# Patient Record
Sex: FEMALE | Race: WHITE | NOT HISPANIC OR LATINO | Employment: OTHER | URBAN - METROPOLITAN AREA
[De-identification: names, ages, dates, MRNs, and addresses within clinical notes are randomized per-mention and may not be internally consistent; named-entity substitution may affect disease eponyms.]

---

## 2017-02-03 ENCOUNTER — OFFICE VISIT (OUTPATIENT)
Dept: NEUROLOGY | Facility: HOSPITAL | Age: 62
End: 2017-02-03
Attending: INTERNAL MEDICINE
Payer: COMMERCIAL

## 2017-02-03 VITALS
DIASTOLIC BLOOD PRESSURE: 86 MMHG | BODY MASS INDEX: 42.27 KG/M2 | WEIGHT: 263 LBS | HEIGHT: 66 IN | SYSTOLIC BLOOD PRESSURE: 155 MMHG | TEMPERATURE: 98 F | HEART RATE: 96 BPM

## 2017-02-03 DIAGNOSIS — D3A.090 BRONCHIAL CARCINOID TUMORS: Primary | ICD-10-CM

## 2017-02-03 PROCEDURE — 3079F DIAST BP 80-89 MM HG: CPT | Mod: ,,, | Performed by: INTERNAL MEDICINE

## 2017-02-03 PROCEDURE — 99215 OFFICE O/P EST HI 40 MIN: CPT | Performed by: INTERNAL MEDICINE

## 2017-02-03 PROCEDURE — 99214 OFFICE O/P EST MOD 30 MIN: CPT | Mod: ,,, | Performed by: INTERNAL MEDICINE

## 2017-02-03 PROCEDURE — 3077F SYST BP >= 140 MM HG: CPT | Mod: ,,, | Performed by: INTERNAL MEDICINE

## 2017-02-03 RX ORDER — PHENAZOPYRIDINE HYDROCHLORIDE 100 MG/1
TABLET, FILM COATED ORAL
Refills: 0 | COMMUNITY
Start: 2017-01-19 | End: 2017-02-03

## 2017-02-03 RX ORDER — BACLOFEN 10 MG/1
10 TABLET ORAL 2 TIMES DAILY
Refills: 2 | COMMUNITY
Start: 2017-01-04

## 2017-02-03 RX ORDER — HYDROCHLOROTHIAZIDE 25 MG/1
TABLET ORAL
Refills: 1 | COMMUNITY
Start: 2016-11-14

## 2017-02-03 RX ORDER — LANREOTIDE ACETATE 120 MG/.5ML
120 INJECTION SUBCUTANEOUS
Refills: 5 | COMMUNITY
Start: 2017-01-11 | End: 2017-08-25 | Stop reason: SDUPTHER

## 2017-02-03 RX ORDER — VALSARTAN AND HYDROCHLOROTHIAZIDE 160; 12.5 MG/1; MG/1
1 TABLET, FILM COATED ORAL DAILY
Refills: 1 | COMMUNITY
Start: 2016-12-05 | End: 2019-01-22 | Stop reason: CLARIF

## 2017-02-03 NOTE — PROGRESS NOTES
NOLANETS:  [facility]  A collaboration between [facility] and [facility]    PATIENT: Yvonne Holliday  MRN: [id number]  DATE: 2/3/2017      Diagnosis:   1. Bronchial carcinoid tumors        Chief Complaint: Follow-up (lung carcinoid/ 6 month follow up)      Oncologic History:      Oncologic History Bronchial carcinoid  diagnosed 10/2005     Oncologic Treatment Sandostatin  Lanreotide 7/2016     Pathology Well differentiated neuroendocrine tumor           Subjective:    Interval History: Ms. Holliday is a 61 y.o. female who returns for follow up for a bronchial carcinoid tumor.  Since I had seen her last she was started on Lanreotide.  She is tolerating this well.  She still has some ongoing cough and fatigue.  She states the symptoms have somewhat improved since being on Lanreotide.  She has no other new complaints.     She was previously seen in this clinic by Salomón Jimenez and Atul but has not been seen since 4/2012.  Her history dates to 2005 when she was diagnosed with a bronchial carcinoid after having a chronic cough for a number of years.  She had had an abnormal x-ray which was monitored and had a biopsy in 9/2005 and was diagnosed with a well-differentiated neuroendocrine carcinoma.  Ki-67 demonstrated that the tumor had a low proliferative activity and was described as a rare tumor cell positive.  She had followed up at [facility] with Dr. Renan Vick who initially started her on short acting octreotide.  She was seen by Dr. Jimenez in July 2009 and converted to octreotide LAR.  She had a mild improvement in her cough but it also noted an improvement in symptoms of flushing.  She is followed in [facility] by Dr. Garibay.  In 7/2016 she was changed to Lanreotide.      Past Medical History:   Past Medical History   Diagnosis Date    Abnormal CT of liver 7/15/2016    Adnexal mass 7/15/2016    Bronchial  carcinoid tumors 7/15/2016    Carcinoid syndrome 7/15/2016    Carcinoid tumor of lung 2005    Coughing     Drug therapy      sandostatin    HTN (hypertension)     Spastic paraplegia, hereditary      walks with cane    Wheezing        Past Surgical HIstory:   Past Surgical History   Procedure Laterality Date     section      Breast surgery       breast reduction       Family History:   Family History   Problem Relation Age of Onset    Cancer Mother      unknown primary    Cancer Maternal Aunt     Cancer Paternal Grandfather      prostate    Cancer Maternal Aunt      gyn       Social History:  reports that she has never smoked. She does not have any smokeless tobacco history on file. She reports that she does not drink alcohol or use illicit drugs.    Allergies:  Review of patient's allergies indicates:   Allergen Reactions    Epinephrine      Neuroendocrine Tumor patient      Pcn [penicillins]        Medications:  Current Outpatient Prescriptions   Medication Sig Dispense Refill    albuterol (PROAIR HFA) 90 mcg/actuation inhaler Inhale 2 puffs into the lungs every 6 (six) hours as needed for Wheezing.      allopurinol (ZYLOPRIM) 100 MG tablet Take 100 mg by mouth 3 (three) times daily.      AMINO ACIDS/MV,FE,MIN (OCUVITE EXTRA ORAL) Take by mouth.      baclofen (LIORESAL) 10 MG tablet 10 mg every evening.   2    budesonide-formoterol 80-4.5 mcg (SYMBICORT) 80-4.5 mcg/actuation HFAA Inhale 2 puffs into the lungs.      buPROPion (WELLBUTRIN XL) 150 MG TB24 tablet Take 150 mg by mouth once daily.      cetirizine (ZYRTEC) 10 MG tablet Take 10 mg by mouth once daily.      clonazePAM (KLONOPIN) 0.5 MG tablet Take 0.5 mg by mouth 2 (two) times daily as needed for Anxiety.      cyanocobalamin (VITAMIN B-12) 1000 MCG tablet Take 100 mcg by mouth once daily.      dextromethorphan (DELSYM) 30 mg/5 mL liquid Take 60 mg by mouth nightly as needed for Cough.      diclofenac sodium  (VOLTAREN) 1 % Gel Apply 2 g topically 4 (four) times daily.      docusate sodium (COLACE) 100 MG capsule Take 100 mg by mouth every evening.       gabapentin (NEURONTIN) 100 MG capsule Take 100 mg by mouth 2 (two) times daily.      hydrochlorothiazide (HYDRODIURIL) 25 MG tablet as needed.   1    hydrocodone-homatropine 5-1.5 mg/5 ml (HYCODAN) 5-1.5 mg/5 mL Syrp Take 5 mLs by mouth every 4 (four) hours as needed.      latanoprost 0.005 % ophthalmic solution Place 1 drop into both eyes every evening.      methocarbamol (ROBAXIN) 500 MG Tab Take 500 mg by mouth 2 (two) times daily as needed.      multivitamin with minerals tablet Take 1 tablet by mouth once daily.      POTASSIUM CITRATE ORAL Take 99 mg by mouth every morning.      ranitidine (ZANTAC) 75 MG tablet Take 150 mg by mouth nightly.       SOMATULINE DEPOT 120 mg/0.5 mL Syrg 120 mg every 28 days.   5    valsartan-hydrochlorothiazide (DIOVAN-HCT) 160-12.5 mg per tablet 1 tablet once daily.   1     No current facility-administered medications for this visit.        Review of Systems   Constitutional: Positive for fatigue. Negative for chills, fever and unexpected weight change.   HENT: Negative for congestion, hearing loss and nosebleeds.    Eyes: Negative for visual disturbance.   Respiratory: Positive for cough and shortness of breath.    Cardiovascular: Positive for leg swelling. Negative for chest pain and palpitations.   Gastrointestinal: Negative for abdominal pain, blood in stool, constipation, diarrhea, nausea and vomiting.   Genitourinary: Negative for dysuria.   Musculoskeletal: Positive for arthralgias and myalgias. Negative for back pain and gait problem.   Skin: Negative for color change and rash.        Flushing   Neurological: Negative for dizziness, weakness and headaches.   Hematological: Negative for adenopathy. Does not bruise/bleed easily.   Psychiatric/Behavioral: Negative for confusion.       ECOG Performance Status: 1  "  Objective:      Vitals:   Vitals:    02/03/17 0928   BP: (!) 155/86   Pulse: 96   Temp: 98 °F (36.7 °C)   TempSrc: Oral   Weight: 119.3 kg (263 lb)   Height: 5' 6" (1.676 m)     BMI: Body mass index is 42.45 kg/(m^2).    Physical Exam   Constitutional: She is oriented to person, place, and time. She appears well-developed and well-nourished. No distress.   HENT:   Head: Normocephalic.   Mouth/Throat: No oropharyngeal exudate.   Eyes: EOM are normal. No scleral icterus.   Neck: Neck supple. No tracheal deviation present. No thyromegaly present.   Cardiovascular: Normal rate and regular rhythm.    Pulmonary/Chest: Effort normal and breath sounds normal. No respiratory distress. She has no wheezes. She has no rales.   Abdominal: Soft. She exhibits no distension and no mass. There is no tenderness. There is no rebound and no guarding.   Musculoskeletal: Normal range of motion. She exhibits edema.   Lymphadenopathy:     She has no cervical adenopathy.   Neurological: She is alert and oriented to person, place, and time. No cranial nerve deficit.   Skin: Skin is warm and dry.   Psychiatric: She has a normal mood and affect.       Laboratory Data:  No visits with results within 1 Month(s) from this visit.  Latest known visit with results is:    Lab Visit on 07/13/2016   Component Date Value Ref Range Status    Chromogranin A 07/13/2016 <5  < OR = 15 ng/mL Final    Comment: This test was performed using a laboratory developed  electrochemiluminescent method. Values obtained with different assay  methods cannot be used interchangeably. Chromogranin A levels,  regardless of value, should not be interpreted as absolute evidence of  the presence or absence of disease.  This test was developed and its analytical performance characteristics  have been determined by [employer] [facility]  [facility]. It has not been cleared or approved by FDA. This assay has  been validated pursuant to the CLIA " regulations and is used for  clinical purposes.  Test Performed at:  [facility]  [address] [place]  [address]     JULIAN Thompson MD, PhD      Neuron Specific Enolase 07/13/2016 9.5  <=15 ng/mL Final    Comment: -------------------ADDITIONAL INFORMATION-------------------  The testing method is a homogeneous time-resolved   immunofluorescent assay manufactured by [employer] and  performed on the [employer] Krypt[employer]act.   Values obtained with different assay methods or kits  may be different and cannot be used interchangeably.  If ordered as a tumor marker, this test result cannot  be interpreted as absolute evidence for the presence  or absence of malignant disease.  Test Performed by:  [facility] [place] [address]  : Ned Gregory II, M.D., Ph.D.      Substance P, P/S 07/13/2016 593  <1700 pg/mL Final    Histamine 07/13/2016 1161  180 - 1800 nmol/L Final    Comment: INTERPRETIVE INFORMATION:  Histamine, Whole Blood  Test developed and characteristics determined by [employer]. See Compliance Statement D: [employer]  Performed by [facility],                              [address] [place] [id number] [phone]                    [email or web address], Al Contreras MD - Lab. Director      Serotonin 07/13/2016 138  <=230 ng/mL Final    Comment: Test Performed by:  [place] [id number]  : Ned Gregory II, M.D., Ph.D.      Sodium 07/13/2016 141  136 - 145 mmol/L Final    Potassium 07/13/2016 3.7  3.5 - 5.1 mmol/L Final    Chloride 07/13/2016 103  95 - 110 mmol/L Final    CO2 07/13/2016 29  23 - 29 mmol/L Final    Glucose 07/13/2016 101  70 - 110 mg/dL Final    BUN, Bld 07/13/2016 13  8 - 23 mg/dL Final    Creatinine 07/13/2016 0.8  0.5 - 1.4 mg/dL Final     Calcium 07/13/2016 9.2  8.7 - 10.5 mg/dL Final    Total Protein 07/13/2016 7.0  6.0 - 8.4 g/dL Final    Albumin 07/13/2016 3.3* 3.5 - 5.2 g/dL Final    Total Bilirubin 07/13/2016 0.6  0.1 - 1.0 mg/dL Final    Comment: For infants and newborns, interpretation of results should be based  on gestational age, weight and in agreement with clinical  observations.  Premature Infant recommended reference ranges:  Up to 24 hours.............<8.0 mg/dL  Up to 48 hours............<12.0 mg/dL  3-5 days..................<15.0 mg/dL  6-29 days.................<15.0 mg/dL      Alkaline Phosphatase 07/13/2016 112  55 - 135 U/L Final    AST 07/13/2016 19  10 - 40 U/L Final    ALT 07/13/2016 19  10 - 44 U/L Final    Anion Gap 07/13/2016 9  8 - 16 mmol/L Final    eGFR if African American 07/13/2016 >60  >60 mL/min/1.73 m^2 Final    eGFR if non African American 07/13/2016 >60  >60 mL/min/1.73 m^2 Final    Comment: Calculation used to obtain the estimated glomerular filtration  rate (eGFR) is the CKD-EPI equation. Since race is unknown   in our information system, the eGFR values for   -American and Non--American patients are given   for each creatinine result.      WBC 07/13/2016 8.07  3.90 - 12.70 K/uL Final    RBC 07/13/2016 4.03  4.00 - 5.40 M/uL Final    Hemoglobin 07/13/2016 12.0  12.0 - 16.0 g/dL Final    Hematocrit 07/13/2016 36.8* 37.0 - 48.5 % Final    MCV 07/13/2016 91  82 - 98 fL Final    MCH 07/13/2016 29.8  27.0 - 31.0 pg Final    MCHC 07/13/2016 32.6  32.0 - 36.0 % Final    RDW 07/13/2016 14.1  11.5 - 14.5 % Final    Platelets 07/13/2016 327  150 - 350 K/uL Final    MPV 07/13/2016 9.8  9.2 - 12.9 fL Final    Gran # 07/13/2016 5.1  1.8 - 7.7 K/uL Final    Lymph # 07/13/2016 2.2  1.0 - 4.8 K/uL Final    Mono # 07/13/2016 0.6  0.3 - 1.0 K/uL Final    Eos # 07/13/2016 0.2  0.0 - 0.5 K/uL Final    Baso # 07/13/2016 0.02  0.00 - 0.20 K/uL Final    Gran% 07/13/2016 62.7  38.0 - 73.0 % Final     Lymph% 07/13/2016 27.0  18.0 - 48.0 % Final    Mono% 07/13/2016 7.4  4.0 - 15.0 % Final    Eosinophil% 07/13/2016 2.5  0.0 - 8.0 % Final    Basophil% 07/13/2016 0.2  0.0 - 1.9 % Final    Differential Method 07/13/2016 Automated   Final    Craig Miscellaneous Result 07/13/2016 See Comment   Final    Comment: Test                       Result         Flag  Unit  RefValue  --------------------------------------------------------------  Substance P                                                     Test         Result   Units    Reference Range  Substance P: 593       pg/mL     <1700 pg/mL  The performance characteristics of this assay was   validated by [employer].  The [place] FDA  has not approved or cleared this test.  [employer]. is a CLIA certified, CAP accredited  laboratory for performing high complexity assays such  as this one.  Test Performed by:  [employer] [facility].  [address] [place] [address]         Imaging:   CT from 11/2016 reviewed with patient             Assessment:       1. Bronchial carcinoid tumors           Plan:     Mrs. Holliday is tolerating treatment with Lanreotide and seems to have an improvement in some of her symptoms.  Review of her CT from 11/2016 shows no detrimental changes.  I will discuss her case and her multidisciplinary neuroendocrine tumor Board and have our radiologist review these images as well.  She should remain on Lanreotide.  Follow back up with me with another CT of the chest in 6 months.      Lobo Smith DO, FACP  Hematology & Oncology, [facility]  [address], [address]  [place]  [address]  ph. [phone]; [phone]  fax. [phone]    25 minutes were spent in coordination of patient's care, record review and counseling.  More than 50% of the time was face-to-face.

## 2017-02-03 NOTE — MR AVS SNAPSHOT
[facility]  [address] [place] [address]  Phone: [phone]  Fax: [phone]                  Yvonne Holliday   2/3/2017 9:30 AM   Office Visit    Description:  Female : 1955   Provider:  Lobo Smith DO, COLT   Department:  [facility]           Reason for Visit     Follow-up           Diagnoses this Visit        Comments    Bronchial carcinoid tumors    -  Primary            To Do List           Future Appointments        Provider Department Dept Phone    2017 11:00 AM Lobo Smith DO, FACRICHELLE [facility] [phone]      Goals (5 Years of Data)     None      [facility]     [facility] Nurse Care Line -  Assistance  Registered nurses in the [facility] provide clinical advisement, health education, appointment booking, and other advisory services.  Call for this free service at [phone].             Medications           Message regarding Medications     Verify the changes and/or additions to your medication regime listed below are the same as discussed with your clinician today.  If any of these changes or additions are incorrect, please notify your healthcare provider.        STOP taking these medications     magnesium 250 mg Tab Take 2 tablets by mouth once daily.    octreotide lar (SANDOSTATIN LAR) 30 mg injection Inject 30 mg into the muscle every 28 days.    phenazopyridine (PYRIDIUM) 100 MG tablet            Verify that the below list of medications is an accurate representation of the medications you are currently taking.  If none reported, the list may be blank. If incorrect, please contact your healthcare provider. Carry this list with you in case of emergency.           Current Medications     albuterol (PROAIR HFA) 90 mcg/actuation inhaler Inhale 2 puffs into the lungs every 6 (six) hours as needed for Wheezing.    allopurinol (ZYLOPRIM) 100 MG tablet Take 100 mg by mouth 3  "(three) times daily.    AMINO ACIDS/MV,FE,MIN (OCUVITE EXTRA ORAL) Take by mouth.    baclofen (LIORESAL) 10 MG tablet 10 mg every evening.     budesonide-formoterol 80-4.5 mcg (SYMBICORT) 80-4.5 mcg/actuation HFAA Inhale 2 puffs into the lungs.    buPROPion (WELLBUTRIN XL) 150 MG TB24 tablet Take 150 mg by mouth once daily.    cetirizine (ZYRTEC) 10 MG tablet Take 10 mg by mouth once daily.    clonazePAM (KLONOPIN) 0.5 MG tablet Take 0.5 mg by mouth 2 (two) times daily as needed for Anxiety.    cyanocobalamin (VITAMIN B-12) 1000 MCG tablet Take 100 mcg by mouth once daily.    dextromethorphan (DELSYM) 30 mg/5 mL liquid Take 60 mg by mouth nightly as needed for Cough.    diclofenac sodium (VOLTAREN) 1 % Gel Apply 2 g topically 4 (four) times daily.    docusate sodium (COLACE) 100 MG capsule Take 100 mg by mouth every evening.     gabapentin (NEURONTIN) 100 MG capsule Take 100 mg by mouth 2 (two) times daily.    hydrochlorothiazide (HYDRODIURIL) 25 MG tablet as needed.     hydrocodone-homatropine 5-1.5 mg/5 ml (HYCODAN) 5-1.5 mg/5 mL Syrp Take 5 mLs by mouth every 4 (four) hours as needed.    latanoprost 0.005 % ophthalmic solution Place 1 drop into both eyes every evening.    methocarbamol (ROBAXIN) 500 MG Tab Take 500 mg by mouth 2 (two) times daily as needed.    multivitamin with minerals tablet Take 1 tablet by mouth once daily.    POTASSIUM CITRATE ORAL Take 99 mg by mouth every morning.    ranitidine (ZANTAC) 75 MG tablet Take 150 mg by mouth nightly.     SOMATULINE DEPOT 120 mg/0.5 mL Syrg 120 mg every 28 days.     valsartan-hydrochlorothiazide (DIOVAN-HCT) 160-12.5 mg per tablet 1 tablet once daily.            Clinical Reference Information           Your Vitals Were     BP Pulse Temp Height Weight BMI    155/86 96 98 °F (36.7 °C) (Oral) 5' 6" (1.676 m) 119.3 kg (263 lb) 42.45 kg/m2      Blood Pressure          Most Recent Value    BP  (!)  155/86      Allergies as of 2/3/2017     Epinephrine    Pcn " [Penicillins]      Immunizations Administered on Date of Encounter - 2/3/2017     None      Instructions    We will talk about you in our Tumor board in the next couple of weeks.      F/u in 6 months with a CT    I will request reports from John for your CT from November 2016         Language Assistance Services     ATTENTION: Language assistance services are available, free of charge. Please call [phone].      ATENCIÓN: Si habla español, tiene a cochran disposición servicios gratuitos de asistencia lingüística. Llame al [phone].     [facility] Ý: N?u b?n nói Ti?ng Vi?t, có các d?ch v? h? tr? ngôn ng? mi?n phí dành cho b?n. G?i s? [phone].         [facility] complies with applicable Federal civil rights laws and does not discriminate on the basis of race, color, national origin, age, disability, or sex.

## 2017-02-03 NOTE — PATIENT INSTRUCTIONS
We will talk about you in our Tumor board in the next couple of weeks.      F/u in 6 months with a CT    I will request reports from John for your CT from November 2016

## 2017-06-01 ENCOUNTER — TELEPHONE (OUTPATIENT)
Dept: NEUROLOGY | Facility: HOSPITAL | Age: 62
End: 2017-06-01

## 2017-06-01 NOTE — TELEPHONE ENCOUNTER
----- Message from Katharina Hernández sent at 6/1/2017  3:36 PM CDT -----  Contact: Christin with [facility]  RR- Christin with [facility] called to states they don't have a form from the doctor on file stating it is all right to deliver the Somatuline  to the patients home. Christin can be reached at [phone] with a verbal or fax a letter to [phone].

## 2017-06-01 NOTE — TELEPHONE ENCOUNTER
Returned call to inform that it is OK to deliver medication to pts home. No answer. LVM for Christin with this information and to return call with any other questions.

## 2017-08-11 ENCOUNTER — OFFICE VISIT (OUTPATIENT)
Dept: NEUROLOGY | Facility: HOSPITAL | Age: 62
End: 2017-08-11
Attending: INTERNAL MEDICINE
Payer: COMMERCIAL

## 2017-08-11 VITALS
HEART RATE: 93 BPM | SYSTOLIC BLOOD PRESSURE: 156 MMHG | HEIGHT: 63 IN | DIASTOLIC BLOOD PRESSURE: 72 MMHG | WEIGHT: 266.81 LBS | TEMPERATURE: 98 F | BODY MASS INDEX: 47.27 KG/M2

## 2017-08-11 DIAGNOSIS — D3A.090 BRONCHIAL CARCINOID TUMORS: Primary | ICD-10-CM

## 2017-08-11 PROCEDURE — 3078F DIAST BP <80 MM HG: CPT | Mod: ,,, | Performed by: INTERNAL MEDICINE

## 2017-08-11 PROCEDURE — 3077F SYST BP >= 140 MM HG: CPT | Mod: ,,, | Performed by: INTERNAL MEDICINE

## 2017-08-11 PROCEDURE — 99215 OFFICE O/P EST HI 40 MIN: CPT | Performed by: INTERNAL MEDICINE

## 2017-08-11 PROCEDURE — 99214 OFFICE O/P EST MOD 30 MIN: CPT | Mod: ,,, | Performed by: INTERNAL MEDICINE

## 2017-08-11 PROCEDURE — 3008F BODY MASS INDEX DOCD: CPT | Mod: ,,, | Performed by: INTERNAL MEDICINE

## 2017-08-11 NOTE — PROGRESS NOTES
NOLANETS:  [facility]  A collaboration between [facility] and [facility]    PATIENT: Yvonne Holliday  MRN: [id number]  DATE: 8/11/2017      Diagnosis:   1. Bronchial carcinoid tumors        Chief Complaint: Follow-up (f/u 6 months with scans)      Oncologic History:      Oncologic History Bronchial carcinoid  diagnosed 10/2005     Oncologic Treatment Sandostatin  Lanreotide 7/2016     Pathology Well differentiated neuroendocrine tumor           Subjective:    Interval History: Ms. Holliday is a 62 y.o. female who returns for follow up for a bronchial carcinoid tumor.  She remains on lanreotide. Continues to complain of fatigue, cough and dyspnea.  No other new complaints.    She was previously seen in this clinic by Salomón Jimenez and Atul but has not been seen since 4/2012.  Her history dates to 2005 when she was diagnosed with a bronchial carcinoid after having a chronic cough for a number of years.  She had had an abnormal x-ray which was monitored and had a biopsy in 9/2005 and was diagnosed with a well-differentiated neuroendocrine carcinoma.  Ki-67 demonstrated that the tumor had a low proliferative activity and was described as a rare tumor cell positive.  [facility] Renan Vick who initially started her on short acting octreotide.  She was seen by Dr. Jimenez in July 2009 and converted to octreotide LAR.  She had a mild improvement in her cough but it also noted an improvement in symptoms of flushing.  She is followed in [facility] by Dr. Garibay.  In 7/2016 she was changed to Lanreotide.      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 She does not have any smokeless tobacco history on file. She reports that she does not drink alcohol or use drugs.    Allergies:  Review of patient's allergies indicates:   Allergen Reactions    Epinephrine      Neuroendocrine Tumor patient      Pcn [penicillins]        Medications:  Current Outpatient Prescriptions   Medication Sig Dispense Refill    albuterol (PROAIR HFA) 90 mcg/actuation inhaler Inhale 2 puffs into the lungs every 6 (six) hours as needed for Wheezing.      AMINO ACIDS/MV,FE,MIN (OCUVITE EXTRA ORAL) Take by mouth.      baclofen (LIORESAL) 10 MG tablet 10 mg every evening.   2    budesonide-formoterol 80-4.5 mcg (SYMBICORT) 80-4.5 mcg/actuation HFAA Inhale 2 puffs into the lungs.      cetirizine (ZYRTEC) 10 MG tablet Take 10 mg by mouth once daily.      clonazePAM (KLONOPIN) 0.5 MG tablet Take 0.5 mg by mouth 2 (two) times daily as needed for Anxiety.      cyanocobalamin (VITAMIN B-12) 1000 MCG tablet Take 100 mcg by mouth once daily.      dextromethorphan (DELSYM) 30 mg/5 mL liquid Take 60 mg by mouth nightly as needed for Cough.      diclofenac sodium (VOLTAREN) 1 % Gel Apply 2 g topically 4 (four) times daily.      docusate sodium (COLACE) 100 MG capsule Take 100 mg by mouth every evening.       gabapentin (NEURONTIN) 100 MG capsule Take 100 mg by mouth 2 (two) times daily.      hydrochlorothiazide (HYDRODIURIL) 25 MG tablet as needed.   1    hydrocodone-homatropine 5-1.5 mg/5  "ml (HYCODAN) 5-1.5 mg/5 mL Syrp Take 5 mLs by mouth every 4 (four) hours as needed.      latanoprost 0.005 % ophthalmic solution Place 1 drop into both eyes every evening.      methocarbamol (ROBAXIN) 500 MG Tab Take 500 mg by mouth 2 (two) times daily as needed.      multivitamin with minerals tablet Take 1 tablet by mouth once daily.      POTASSIUM CITRATE ORAL Take 99 mg by mouth every morning.      ranitidine (ZANTAC) 75 MG tablet Take 150 mg by mouth nightly.       SOMATULINE DEPOT 120 mg/0.5 mL Syrg 120 mg every 28 days.   5    valsartan-hydrochlorothiazide (DIOVAN-HCT) 160-12.5 mg per tablet 1 tablet once daily.   1     No current facility-administered medications for this visit.        Review of Systems   Constitutional: Positive for fatigue. Negative for chills, fever and unexpected weight change.   HENT: Negative for congestion, hearing loss and nosebleeds.    Eyes: Negative for visual disturbance.   Respiratory: Positive for cough and shortness of breath.    Cardiovascular: Positive for leg swelling. Negative for chest pain and palpitations.   Gastrointestinal: Negative for abdominal pain, blood in stool, constipation, diarrhea, nausea and vomiting.   Genitourinary: Negative for dysuria.   Musculoskeletal: Positive for arthralgias and myalgias. Negative for back pain and gait problem.   Skin: Negative for color change and rash.        Flushing   Neurological: Negative for dizziness, weakness and headaches.   Hematological: Negative for adenopathy. Does not bruise/bleed easily.   Psychiatric/Behavioral: Negative for confusion.       ECOG Performance Status: 1   Objective:      Vitals:   Vitals:    08/11/17 1103   BP: (!) 156/72   Pulse: 93   Temp: 98.2 °F (36.8 °C)   TempSrc: Oral   Weight: 121 kg (266 lb 12.8 oz)   Height: 5' 3" (1.6 m)     BMI: Body mass index is 47.26 kg/m².    Physical Exam   Constitutional: She is oriented to person, place, and time. She appears well-developed and " well-nourished. No distress.   HENT:   Head: Normocephalic.   Mouth/Throat: No oropharyngeal exudate.   Eyes: EOM are normal. No scleral icterus.   Neck: Neck supple. No tracheal deviation present. No thyromegaly present.   Cardiovascular: Normal rate and regular rhythm.    Pulmonary/Chest: Effort normal and breath sounds normal. No respiratory distress. She has no wheezes. She has no rales.   Abdominal: Soft. She exhibits no distension and no mass. There is no tenderness. There is no rebound and no guarding.   Musculoskeletal: Normal range of motion. She exhibits edema.   Lymphadenopathy:     She has no cervical adenopathy.   Neurological: She is alert and oriented to person, place, and time. No cranial nerve deficit.   Skin: Skin is warm and dry.   Psychiatric: She has a normal mood and affect.       Laboratory Data:  No visits with results within 1 Month(s) from this visit.   Latest known visit with results is:   Lab Visit on 07/13/2016   Component Date Value Ref Range Status    Chromogranin A 07/20/2016 <5  < OR = 15 ng/mL Final    Comment: This test was performed using a laboratory developed  electrochemiluminescent method. Values obtained with different assay  methods cannot be used interchangeably. Chromogranin A levels,  regardless of value, should not be interpreted as absolute evidence of  the presence or absence of disease.  This test was developed and its analytical performance characteristics  have been determined by [facility]. It has not been cleared or approved by FDA. This assay has  been validated pursuant to the CLIA regulations and is used for  clinical purposes.  Test Performed at:  [facility] [address]  [address]     JULIAN Thompson MD, PhD      Neuron Specific Enolase 07/14/2016 9.5  <=15 ng/mL Final    Comment: -------------------ADDITIONAL INFORMATION-------------------  The testing  method is a homogeneous time-resolved   immunofluorescent assay manufactured by [employer] and  performed on the [employer].   Values obtained with different assay methods or kits  may be different and cannot be used interchangeably.  If ordered as a tumor marker, this test result cannot  be interpreted as absolute evidence for the presence  or absence of malignant disease.  Test Performed by:  [place] [address]  : Ned Gregory II, M.D., Ph.D.      Substance P, P/S 09/08/2016 593  <1700 pg/mL Final    Histamine 07/16/2016 1161  180 - 1800 nmol/L Final    Comment: INTERPRETIVE INFORMATION:  Histamine, Whole Blood  Test developed and characteristics determined by [employer]. See Compliance Statement D: [employer]  Performed by [facility],                              [address] [place] [id number] [phone]                    [email or web address], Al Contreras MD - Lab. Director      Serotonin 07/18/2016 138  <=230 ng/mL Final    Comment: Test Performed by:  [facility] [place] [address]  : Ned Gregory II, M.D., Ph.D.      Sodium 07/13/2016 141  136 - 145 mmol/L Final    Potassium 07/13/2016 3.7  3.5 - 5.1 mmol/L Final    Chloride 07/13/2016 103  95 - 110 mmol/L Final    CO2 07/13/2016 29  23 - 29 mmol/L Final    Glucose 07/13/2016 101  70 - 110 mg/dL Final    BUN, Bld 07/13/2016 13  8 - 23 mg/dL Final    Creatinine 07/13/2016 0.8  0.5 - 1.4 mg/dL Final    Calcium 07/13/2016 9.2  8.7 - 10.5 mg/dL Final    Total Protein 07/13/2016 7.0  6.0 - 8.4 g/dL Final    Albumin 07/13/2016 3.3* 3.5 - 5.2 g/dL Final    Total Bilirubin 07/13/2016 0.6  0.1 - 1.0 mg/dL Final    Comment: For infants and newborns, interpretation of results should be based  on gestational age, weight and in agreement with  clinical  observations.  Premature Infant recommended reference ranges:  Up to 24 hours.............<8.0 mg/dL  Up to 48 hours............<12.0 mg/dL  3-5 days..................<15.0 mg/dL  6-29 days.................<15.0 mg/dL      Alkaline Phosphatase 07/13/2016 112  55 - 135 U/L Final    AST 07/13/2016 19  10 - 40 U/L Final    ALT 07/13/2016 19  10 - 44 U/L Final    Anion Gap 07/13/2016 9  8 - 16 mmol/L Final    eGFR if African American 07/13/2016 >60  >60 mL/min/1.73 m^2 Final    eGFR if non African American 07/13/2016 >60  >60 mL/min/1.73 m^2 Final    Comment: Calculation used to obtain the estimated glomerular filtration  rate (eGFR) is the CKD-EPI equation. Since race is unknown   in our information system, the eGFR values for   -American and Non--American patients are given   for each creatinine result.      WBC 07/13/2016 8.07  3.90 - 12.70 K/uL Final    RBC 07/13/2016 4.03  4.00 - 5.40 M/uL Final    Hemoglobin 07/13/2016 12.0  12.0 - 16.0 g/dL Final    Hematocrit 07/13/2016 36.8* 37.0 - 48.5 % Final    MCV 07/13/2016 91  82 - 98 fL Final    MCH 07/13/2016 29.8  27.0 - 31.0 pg Final    MCHC 07/13/2016 32.6  32.0 - 36.0 % Final    RDW 07/13/2016 14.1  11.5 - 14.5 % Final    Platelets 07/13/2016 327  150 - 350 K/uL Final    MPV 07/13/2016 9.8  9.2 - 12.9 fL Final    Gran # 07/13/2016 5.1  1.8 - 7.7 K/uL Final    Lymph # 07/13/2016 2.2  1.0 - 4.8 K/uL Final    Mono # 07/13/2016 0.6  0.3 - 1.0 K/uL Final    Eos # 07/13/2016 0.2  0.0 - 0.5 K/uL Final    Baso # 07/13/2016 0.02  0.00 - 0.20 K/uL Final    Gran% 07/13/2016 62.7  38.0 - 73.0 % Final    Lymph% 07/13/2016 27.0  18.0 - 48.0 % Final    Mono% 07/13/2016 7.4  4.0 - 15.0 % Final    Eosinophil% 07/13/2016 2.5  0.0 - 8.0 % Final    Basophil% 07/13/2016 0.2  0.0 - 1.9 % Final    Differential Method 07/13/2016 Automated   Final    [place] Miscellaneous Result 07/26/2016 See Comment   Final    Comment: Test                        Result         Flag  Unit  RefValue  --------------------------------------------------------------  Substance P                                                     Test         Result   Units    Reference Range  Substance P: 593       pg/mL     <1700 pg/mL  The performance characteristics of this assay was   validated by [employer].  The [place] FDA  has not approved or cleared this test.  [employer]. is a CLIA certified, CAP accredited  laboratory for performing high complexity assays such  as this one.  Test Performed by:  [employer] [facility].  [address] [place] [address]         Imaging:   CT from 11/2016 reviewed with patient             Assessment:       1. Bronchial carcinoid tumors           Plan:     Mrs. Holliday continues to tolerate treatment with lanreotide.  I don't have new images and have written for repeat CT's to be done now.  Following this we will review her case in our tumor board.  We have also discussed weight loss surgery which she is considering and have told her this may help with some of her symptoms. She would need an octreotide infusion during surgery.  Follow up in 6 months.      Lobo Smith DO, [facility]  Hematology & Oncology, [facility]  [address], [address]  [place]  [address]  ph. [phone]; [phone]  fax. [phone]    25 minutes were spent in coordination of patient's care, record review and counseling.  More than 50% of the time was face-to-face.

## 2017-08-11 NOTE — PATIENT INSTRUCTIONS
CT chest without contrast now and in 6 months  CT Abdomen and pelvis with and without contrast now and in 6 months  Get us the CD's and the reports to us as soon as possible and then we will present your case at the NET tumor board and call you with the recommendations   6 month f/u after scans done again in 6 months and appointment scheduled

## 2017-08-21 LAB

## 2017-08-25 DIAGNOSIS — D3A.090 BRONCHIAL CARCINOID TUMORS: Primary | ICD-10-CM

## 2017-08-25 RX ORDER — LANREOTIDE ACETATE 120 MG/.5ML
120 INJECTION SUBCUTANEOUS
Qty: 1 SYRINGE | Refills: 5 | Status: SHIPPED | OUTPATIENT
Start: 2017-08-25 | End: 2017-08-31 | Stop reason: SDUPTHER

## 2017-08-31 DIAGNOSIS — D3A.090 BRONCHIAL CARCINOID TUMORS: ICD-10-CM

## 2017-08-31 RX ORDER — LANREOTIDE ACETATE 120 MG/.5ML
120 INJECTION SUBCUTANEOUS
Qty: 1 SYRINGE | Refills: 5 | Status: SHIPPED | OUTPATIENT
Start: 2017-08-31 | End: 2018-03-15 | Stop reason: SDUPTHER

## 2017-09-05 ENCOUNTER — CONFERENCE (OUTPATIENT)
Dept: NEUROLOGY | Facility: HOSPITAL | Age: 62
End: 2017-09-05

## 2017-09-05 NOTE — TELEPHONE ENCOUNTER
Multidisciplinary NET Tumor Board Summary:     Presenter:    Attendees:    Surgery:   MD MEMO Patricio MD T. Ramcharan, MDMD  Oncology - Lobo Smith MD  Gastroenterology - Not present         Discussion: Reviewed medical history, scans and lab work      Recommendations:   1. Follow up in 6 months

## 2017-09-12 ENCOUNTER — CONFERENCE (OUTPATIENT)
Dept: NEUROLOGY | Facility: HOSPITAL | Age: 62
End: 2017-09-12

## 2017-09-12 NOTE — TELEPHONE ENCOUNTER
Multidisciplinary NET Tumor Board Summary:     Presenter:    Attendees:    Surgery:   MD MEMO Patricio MD T. Ramcharan, MD  Interventional Radiology - Rangel Kelley MD  Pathology - Kristin Carmona MD  Oncology - Lobo Smith MD  Gastroenterology - Not present       Discussion: Reviewed medical history, scans and lab work, multiple lung nodules, stable for now,       Recommendations:   1. Follow up in 6 months with non contrasted CT

## 2017-09-27 ENCOUNTER — TELEPHONE (OUTPATIENT)
Dept: NEUROLOGY | Facility: HOSPITAL | Age: 62
End: 2017-09-27

## 2017-09-27 NOTE — TELEPHONE ENCOUNTER
Let patient know to follow up in 6 months with a non contrasted CT of the chest. Will send reminder in the mail.

## 2017-11-15 ENCOUNTER — TELEPHONE (OUTPATIENT)
Dept: NEUROLOGY | Facility: HOSPITAL | Age: 62
End: 2017-11-15

## 2017-11-15 NOTE — TELEPHONE ENCOUNTER
Called [facility] and they said that they needed to schedule a delivery. 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

## 2017-11-15 NOTE — TELEPHONE ENCOUNTER
----- Message from Ruby Holden sent at 11/14/2017 10:59 AM CST -----  Contact: [facility]  _x  1st Request  _  2nd Request  _  3rd Request    Who:[facility]    Why: calling to verify if the deliver a medication.. Please advise    What Number to Call Back: [phone]    When to Expect a call back: (Before the end of the day)   -- if call after 3:00 call back will be tomorrow.

## 2018-01-26 ENCOUNTER — TELEPHONE (OUTPATIENT)
Dept: NEUROLOGY | Facility: HOSPITAL | Age: 63
End: 2018-01-26

## 2018-01-26 NOTE — TELEPHONE ENCOUNTER
----- Message from Patricia Gm sent at 1/26/2018 12:36 PM CST -----  Contact: Patient  RR- Patient wanted to call Dr Smith to let him know that she is diagnosed with Flu type A. Patient's nurse practioner recommended that she let Dr Smith know in case there is a protocol. Call back n umber [phone]

## 2018-03-06 ENCOUNTER — TELEPHONE (OUTPATIENT)
Dept: NEUROLOGY | Facility: HOSPITAL | Age: 63
End: 2018-03-06

## 2018-03-06 NOTE — TELEPHONE ENCOUNTER
----- Message from Patriciaalexa Worrell sent at 3/5/2018  1:49 PM CST -----  Contact: Patient  RR- Patient needs orders for her ct before her appt with Dr Smith. Facility name [facility] phone number [phone]. Ask for Radiology. Patient call back [phone].

## 2018-03-06 NOTE — TELEPHONE ENCOUNTER
Gave orders, ICD-10 codes, dx and insurance information for patient to Radiology at [facility] for her to have her CT scan prior to her appointment on the 20th of march.  LVM of date and time they scheduled her scan.

## 2018-03-06 NOTE — TELEPHONE ENCOUNTER
----- Message from Katharina Hernández sent at 3/6/2018 10:13 AM CST -----  Contact: Patient  RR- Patient is calling to see if the hospital called for orders for scans to be faxed to [facility] in [facility]. Patient would like a call back at [phone].

## 2018-03-06 NOTE — TELEPHONE ENCOUNTER
----- Message from Patriciaalexa Worrell sent at 3/5/2018  1:49 PM CST -----  Contact: Patient  RR- Patient needs orders for her ct before her appt with Dr Smith. Facility name [facility] phone number [phone]. Ask for Radiology. Patient call back [phone].

## 2018-03-12 ENCOUNTER — TELEPHONE (OUTPATIENT)
Dept: NEUROLOGY | Facility: HOSPITAL | Age: 63
End: 2018-03-12

## 2018-03-12 NOTE — TELEPHONE ENCOUNTER
----- Message from Patricia Worrell sent at 3/12/2018  8:31 AM CDT -----  Contact: [facility]- Facility called [facility] needing the written order of the ct chest to be faxed to [phone] for the scan 3/13/2018.

## 2018-03-14 DIAGNOSIS — D3A.090 BRONCHIAL CARCINOID TUMORS: ICD-10-CM

## 2018-03-14 NOTE — TELEPHONE ENCOUNTER
Spoke to patient, she is out of refills on her somatuline depot and the  nurse that gives it to her usually, quit and it will be 8 days before they can get another person to give it to her.  She said she has a son, nephew and daughter that are all RNs and should be able to give it to her.  I told her if we can get it called in to [facility], maybe we can get one of her family members to call infusion at [facility] and they can describe how to give the injection.

## 2018-03-15 RX ORDER — LANREOTIDE ACETATE 120 MG/.5ML
120 INJECTION SUBCUTANEOUS
Qty: 1 SYRINGE | Refills: 5 | Status: SHIPPED | OUTPATIENT
Start: 2018-03-15 | End: 2019-03-16 | Stop reason: SDUPTHER

## 2018-03-15 NOTE — TELEPHONE ENCOUNTER
----- Message from Patricia Worrell sent at 3/14/2018  4:01 PM CDT -----  Contact: patient  RR- Patient called needing her refill on her somatuline depot she needs it by Friday. Patients call back number is [phone] and the knowNormal [phone]

## 2018-03-16 ENCOUNTER — TELEPHONE (OUTPATIENT)
Dept: NEUROLOGY | Facility: HOSPITAL | Age: 63
End: 2018-03-16

## 2018-03-16 ENCOUNTER — TELEPHONE (OUTPATIENT)
Dept: HEMATOLOGY/ONCOLOGY | Facility: CLINIC | Age: 63
End: 2018-03-16

## 2018-03-16 NOTE — TELEPHONE ENCOUNTER
----- Message from Lo Jensen sent at 3/16/2018 10:28 AM CDT -----  Contact: Marline from [facility]   x_  1st Request  _  2nd Request  _  3rd Request        Who: Marline from [facility]     Why: Requesting a call back in regards to getting prior authorization for somatuline depo injection called to [facility] specialty pharmacy [phone]   Please return the call at earliest convenience. Thanks!    What Number to Call Back: [phone]      When to Expect a call back: (Within 24 hours)

## 2018-03-16 NOTE — TELEPHONE ENCOUNTER
See previous noes

## 2018-03-16 NOTE — TELEPHONE ENCOUNTER
----- Message from Lo Jensen sent at 3/16/2018  9:35 AM CDT -----  Contact: Kateryna from [facility]  _x  1st Request  _  2nd Request  _  3rd Request    Who: Kateryna from [facility]    Why: Requesting a call back in regards to patient's injection thats administrated by home health nurse says patient current nurse has quit and the new nurse will not be able to start working until march 24th and that will put patient 8 days late on injection. Kateryna would like to know can patient wait that long or should she come in to doctor's office to have those injections done until nurse is able to start.    Please return the call at earliest convenience. Thanks!    What Number to Call Back: [phone] ext [phone]       When to Expect a call back: (Within 24 hours)

## 2018-03-16 NOTE — TELEPHONE ENCOUNTER
Spoke to [facility], let them know I spoke with the patient about there not being a nurse to give her the medication.  I let him know that I have talked about this situation.  I am still waiting on a PA

## 2018-03-20 ENCOUNTER — TELEPHONE (OUTPATIENT)
Dept: NEUROLOGY | Facility: HOSPITAL | Age: 63
End: 2018-03-20

## 2018-03-20 ENCOUNTER — OFFICE VISIT (OUTPATIENT)
Dept: NEUROLOGY | Facility: HOSPITAL | Age: 63
End: 2018-03-20
Attending: INTERNAL MEDICINE
Payer: COMMERCIAL

## 2018-03-20 VITALS
SYSTOLIC BLOOD PRESSURE: 157 MMHG | DIASTOLIC BLOOD PRESSURE: 84 MMHG | HEART RATE: 89 BPM | HEIGHT: 66 IN | TEMPERATURE: 98 F | BODY MASS INDEX: 43.63 KG/M2 | WEIGHT: 271.5 LBS

## 2018-03-20 DIAGNOSIS — D3A.090 BRONCHIAL CARCINOID TUMORS: Primary | ICD-10-CM

## 2018-03-20 PROCEDURE — 99214 OFFICE O/P EST MOD 30 MIN: CPT | Mod: ,,, | Performed by: INTERNAL MEDICINE

## 2018-03-20 PROCEDURE — 3079F DIAST BP 80-89 MM HG: CPT | Mod: CPTII,,, | Performed by: INTERNAL MEDICINE

## 2018-03-20 PROCEDURE — 99215 OFFICE O/P EST HI 40 MIN: CPT | Performed by: INTERNAL MEDICINE

## 2018-03-20 PROCEDURE — 3077F SYST BP >= 140 MM HG: CPT | Mod: CPTII,,, | Performed by: INTERNAL MEDICINE

## 2018-03-20 RX ORDER — LORATADINE 10 MG/1
10 TABLET ORAL EVERY MORNING
COMMUNITY
End: 2021-04-26

## 2018-03-20 NOTE — PATIENT INSTRUCTIONS
We will request the CD and report for your CT scan from [facility]    We will speak about you in our tumor board once we get you scans    Follow up in 6 months with CT scan (appt made, subject to change)

## 2018-03-20 NOTE — PROGRESS NOTES
"NOLANETS:  [facility]  A collaboration between [facility] and [facility]    PATIENT: Yvonne Holliday  MRN: [id number]  DATE: 3/20/2018      Diagnosis:   1. Bronchial carcinoid tumors        Chief Complaint: Follow-up (6 month f/u with scans )      Oncologic History:      Oncologic History Bronchial carcinoid  diagnosed 10/2005     Oncologic Treatment Sandostatin  Lanreotide 7/2016     Pathology Well differentiated neuroendocrine tumor           Subjective:    Interval History: Ms. Holliday is a 62 y.o. female who returns for follow up for a bronchial carcinoid tumor.  She remains on lanreotide. Continues to complain of fatigue, cough and dyspnea.  She recently retired and enjoying long-term.  She has no new complaints.      She was previously seen in this clinic by Salomón Jimeenz and Atul but has not been seen since 4/2012.  Her history dates to 2005 when she was diagnosed with a bronchial carcinoid after having a chronic cough for a number of years.  She had had an abnormal x-ray which was monitored and had a biopsy in 9/2005 and was diagnosed with a well-differentiated neuroendocrine carcinoma.  Ki-67 demonstrated that the tumor had a low proliferative activity and was described as "a rare tumor cell is positive".  She had followed up at [facility] with Dr. Renan Vick who initially started her on short acting octreotide.  She was seen by Dr. Jimenez in July 2009 and converted to octreotide LAR.  She had a mild improvement in her cough but it also noted an improvement in symptoms of flushing.  She is followed in [facility] by Dr. Garibay.  In 7/2016 she was changed to Lanreotide.      Past Medical History:   Past Medical History:   Diagnosis Date    Abnormal CT of liver 7/15/2016    Adnexal mass 7/15/2016    Carcinoid syndrome 7/15/2016    Carcinoid tumor of lung 09/2005    Coughing     Drug therapy 2010    " sandostatin    HTN (hypertension)     Malignant carcinoid tumor of bronchus and lung 2016    Spastic paraplegia, hereditary     walks with cane    Wheezing        Past Surgical HIstory:   Past Surgical History:   Procedure Laterality Date    BREAST SURGERY      breast reduction     SECTION         Family History:   Family History   Problem Relation Age of Onset    Cancer Mother      unknown primary    Cancer Maternal Aunt     Cancer Paternal Grandfather      prostate    Cancer Maternal Aunt      gyn       Social History:  reports that she has never smoked. She does not have any smokeless tobacco history on file. She reports that she does not drink alcohol or use drugs.    Allergies:  Review of patient's allergies indicates:   Allergen Reactions    Epinephrine      Neuroendocrine Tumor patient      Pcn [penicillins]        Medications:  Current Outpatient Prescriptions   Medication Sig Dispense Refill    albuterol (PROAIR HFA) 90 mcg/actuation inhaler Inhale 2 puffs into the lungs every 6 (six) hours as needed for Wheezing.      AMINO ACIDS/MV,FE,MIN (OCUVITE EXTRA ORAL) Take by mouth.      baclofen (LIORESAL) 10 MG tablet 10 mg every evening.   2    budesonide-formoterol 80-4.5 mcg (SYMBICORT) 80-4.5 mcg/actuation HFAA Inhale 2 puffs into the lungs.      cetirizine (ZYRTEC) 10 MG tablet Take 10 mg by mouth once daily.      cyanocobalamin (VITAMIN B-12) 1000 MCG tablet Take 100 mcg by mouth once daily.      dextromethorphan (DELSYM) 30 mg/5 mL liquid Take 60 mg by mouth nightly as needed for Cough.      diclofenac sodium (VOLTAREN) 1 % Gel Apply 2 g topically 4 (four) times daily.      docusate sodium (COLACE) 100 MG capsule Take 100 mg by mouth every evening.       gabapentin (NEURONTIN) 100 MG capsule Take 100 mg by mouth 2 (two) times daily.      hydrochlorothiazide (HYDRODIURIL) 25 MG tablet as needed.   1    latanoprost 0.005 % ophthalmic solution Place 1 drop into both  eyes every evening.      loratadine (CLARITIN) 10 mg tablet Take 10 mg by mouth once daily.      methocarbamol (ROBAXIN) 500 MG Tab Take 500 mg by mouth 2 (two) times daily as needed.      multivitamin with minerals tablet Take 1 tablet by mouth once daily.      POTASSIUM CITRATE ORAL Take 99 mg by mouth every morning.      ranitidine (ZANTAC) 75 MG tablet Take 150 mg by mouth nightly.       SOMATULINE DEPOT 120 mg/0.5 mL Syrg Inject 0.5 mLs (120 mg total) into the skin every 28 days. 1 Syringe 5    valsartan-hydrochlorothiazide (DIOVAN-HCT) 160-12.5 mg per tablet 1 tablet once daily.   1    clonazePAM (KLONOPIN) 0.5 MG tablet Take 0.5 mg by mouth 2 (two) times daily as needed for Anxiety.      hydrocodone-homatropine 5-1.5 mg/5 ml (HYCODAN) 5-1.5 mg/5 mL Syrp Take 5 mLs by mouth every 4 (four) hours as needed.       No current facility-administered medications for this visit.        Review of Systems   Constitutional: Positive for fatigue. Negative for chills, fever and unexpected weight change.   HENT: Negative for congestion, hearing loss and nosebleeds.    Eyes: Negative for visual disturbance.   Respiratory: Positive for cough and shortness of breath.    Cardiovascular: Positive for leg swelling. Negative for chest pain and palpitations.   Gastrointestinal: Negative for abdominal pain, blood in stool, constipation, diarrhea, nausea and vomiting.   Genitourinary: Negative for dysuria.   Musculoskeletal: Positive for arthralgias and myalgias. Negative for back pain and gait problem.   Skin: Negative for color change and rash.        Flushing   Neurological: Negative for dizziness, weakness and headaches.   Hematological: Negative for adenopathy. Does not bruise/bleed easily.   Psychiatric/Behavioral: Negative for confusion.       ECOG Performance Status: 1   Objective:      Vitals:   Vitals:    03/20/18 1041   BP: (!) 157/84   Pulse: 89   Temp: 97.6 °F (36.4 °C)   TempSrc: Oral   Weight: 123.1 kg (271 lb  "7.9 oz)   Height: 5' 6" (1.676 m)     BMI: Body mass index is 43.82 kg/m².    Physical Exam   Constitutional: She is oriented to person, place, and time. She appears well-developed and well-nourished. No distress.   HENT:   Head: Normocephalic.   Mouth/Throat: No oropharyngeal exudate.   Eyes: EOM are normal. No scleral icterus.   Neck: Neck supple. No tracheal deviation present. No thyromegaly present.   Cardiovascular: Normal rate and regular rhythm.    Pulmonary/Chest: Effort normal and breath sounds normal. No respiratory distress. She has no wheezes. She has no rales.   Abdominal: Soft. She exhibits no distension and no mass. There is no tenderness. There is no rebound and no guarding.   Musculoskeletal: Normal range of motion. She exhibits edema.   Lymphadenopathy:     She has no cervical adenopathy.   Neurological: She is alert and oriented to person, place, and time. No cranial nerve deficit.   Skin: Skin is warm and dry.   Psychiatric: She has a normal mood and affect.       Laboratory Data:  No visits with results within 1 Month(s) from this visit.   Latest known visit with results is:   Abstract on 08/21/2017   Component Date Value Ref Range Status    EXT BUN 08/21/2017 12  5 - 18 Final    EXT Creatinine 08/21/2017 1.0  0.6 - 1.3 mg/dL Final       Imaging:   CT from 11/2016 reviewed with patient             Assessment:       1. Bronchial carcinoid tumors           Plan:     Mrs. Holliday continues to tolerate treatment with lanreotide. She had a CT done last week that she tells me was stable but don't have the results.  We will request these.  She is to follow back up in 6 months with a repeat CT of the chest.  Report any new symptoms.      Lobo Smith DO, FACP  Hematology & Oncology, [facility]  [address], [address]  [place]  [address]  ph. [phone]; [phone]  fax. [phone]    25 minutes were spent in coordination of patient's care, record review and " counseling.  More than 50% of the time was face-to-face.

## 2018-05-15 ENCOUNTER — TELEPHONE (OUTPATIENT)
Dept: NEUROLOGY | Facility: HOSPITAL | Age: 63
End: 2018-05-15

## 2018-05-15 NOTE — TELEPHONE ENCOUNTER
----- Message from Nish Heart sent at 5/15/2018 11:07 AM CDT -----  Contact: [facility]            Name of Who is Calling: Christina      What is the request in detail: Christina is requesting a call back in reference to questions the patient has about her Sonatuline injection.      Can the clinic reply by [facility]: no      What Number to Call Back if not in [facility]: [phone] ext [phone]

## 2018-05-17 ENCOUNTER — TELEPHONE (OUTPATIENT)
Dept: HEMATOLOGY/ONCOLOGY | Facility: CLINIC | Age: 63
End: 2018-05-17

## 2018-05-17 NOTE — TELEPHONE ENCOUNTER
----- Message from Greta Mccann sent at 5/17/2018  4:05 PM CDT -----  Contact: Christina  Name of Who is Calling: Christina from [facility]       What is the request in detail: Christina was calling in reference to the injection the patient is receiving she wanted to see how may days pass the 28 days could without patient go with out the injection       Can the clinic reply by [facility]: No       What Number to Call Back if not in [facility]: [phone] ext [phone]

## 2018-05-18 NOTE — TELEPHONE ENCOUNTER
Spoke to rep at [facility], they are having a hard time with the agency that gives the injection providing nurses to give the Somatuline on time.  She was due on Thursday, they can not give it until Saturday. I explained every 28 days is optimal for disease management but if she has to go 2 days beyond as long as it is not for Symptom management.  If it is for symptom management, they need to provide a nurse ASAP and speak to the patient.  I asked if they offered to teach the patient how to deliver herself, they have and she has refused.

## 2018-08-29 ENCOUNTER — TELEPHONE (OUTPATIENT)
Dept: NEUROLOGY | Facility: HOSPITAL | Age: 63
End: 2018-08-29

## 2018-08-29 NOTE — TELEPHONE ENCOUNTER
----- Message from Katharina Hernández sent at 8/29/2018  1:05 PM CDT -----  Contact: Princess EFRAIN Chawla with [facility] is calling for clarification on a prescription for Somatuline    can be reached at [phone].

## 2018-08-29 NOTE — TELEPHONE ENCOUNTER
Notified by [facility] they can not fill medication without an [employer] card. [facility] said they will contact [employer].

## 2018-09-14 ENCOUNTER — TELEPHONE (OUTPATIENT)
Dept: NEUROLOGY | Facility: HOSPITAL | Age: 63
End: 2018-09-14

## 2018-09-14 ENCOUNTER — PATIENT MESSAGE (OUTPATIENT)
Dept: NEUROLOGY | Facility: HOSPITAL | Age: 63
End: 2018-09-14

## 2018-09-14 NOTE — TELEPHONE ENCOUNTER
----- Message from Katharina Hernández sent at 9/14/2018 11:16 AM CDT -----  Contact: Patient  RR- Patient [facility] needs a release of information faxed to [phone] in order to obtain her report of CT scan.  Patient can be reached at [phone].

## 2018-10-01 ENCOUNTER — CONFERENCE (OUTPATIENT)
Dept: NEUROLOGY | Facility: HOSPITAL | Age: 63
End: 2018-10-01

## 2018-10-01 NOTE — TELEPHONE ENCOUNTER
[facility]      NEUROENDOCRINE TUMOR MULTIDISCIPLINARY TUMOR BOARD  _____________________________________________________________________    PRESENTER:   Lobo Smith DO    REASON FOR PRESENTATION:  Treatment Plan and Scan Review, progression    ATTENDEES:   Surgery:              MD MEMO Patricio MD T. Ramcharan, MD  Interventional Radiology - Rangel Kelley MD  Pathology -  Oncology - Lobo Smith DO, Sherman Rendon MD  Gastroenterology - Not present   Nursing  Research    PATIENT STATUS:  Established Patient    TUMOR SITE (Primary & Mets):  See below    PATIENT SUMMARY:  Past Medical History:   Diagnosis Date    Abnormal CT of liver 7/15/2016    Adnexal mass 7/15/2016    Carcinoid syndrome 7/15/2016    Carcinoid tumor of lung 2005    Coughing     Drug therapy     sandostatin    HTN (hypertension)     Malignant carcinoid tumor of bronchus and lung 2016    Spastic paraplegia, hereditary     walks with cane    Wheezing        Past Surgical History:   Procedure Laterality Date    BREAST SURGERY      breast reduction     SECTION       ________________________________________________________________  Diagnosis:   1. Bronchial carcinoid tumors          Chief Complaint: Follow-up (6 month f/u with scans )        Oncologic History:             Oncologic History Bronchial carcinoid  diagnosed 10/2005     Oncologic Treatment Sandostatin  Lanreotide 2016     Pathology Well differentiated neuroendocrine tumor           DISCUSSION:  No new images since 3/13/18, just report of a CT CAP 18 was says stable pulm and hepatic mets and an enlarging left adnexal mass.      BOARD RECOMMENDATIONS:   Need images from September

## 2018-10-04 NOTE — TELEPHONE ENCOUNTER
Requested scans again.  Spoke to imaging with [facility] and let them know to please expedite.

## 2018-10-09 ENCOUNTER — OFFICE VISIT (OUTPATIENT)
Dept: NEUROLOGY | Facility: HOSPITAL | Age: 63
End: 2018-10-09
Attending: INTERNAL MEDICINE
Payer: COMMERCIAL

## 2018-10-09 VITALS
DIASTOLIC BLOOD PRESSURE: 61 MMHG | HEIGHT: 66 IN | BODY MASS INDEX: 40.71 KG/M2 | OXYGEN SATURATION: 96 % | SYSTOLIC BLOOD PRESSURE: 143 MMHG | WEIGHT: 253.31 LBS | TEMPERATURE: 99 F | HEART RATE: 74 BPM

## 2018-10-09 DIAGNOSIS — D3A.090 BRONCHIAL CARCINOID TUMORS: Primary | ICD-10-CM

## 2018-10-09 DIAGNOSIS — R93.2 ABNORMAL CT OF LIVER: ICD-10-CM

## 2018-10-09 DIAGNOSIS — C7A.8 NEUROENDOCRINE CANCER: ICD-10-CM

## 2018-10-09 DIAGNOSIS — N94.89 ADNEXAL MASS: ICD-10-CM

## 2018-10-09 LAB
EXT 24 HR UR METANEPHRINE: NORMAL
EXT 24 HR UR NORMETANEPHRINE: NORMAL
EXT 24 HR UR NORMETANEPHRINE: NORMAL
EXT 25 HYDROXY VIT D2: NORMAL
EXT 25 HYDROXY VIT D3: NORMAL
EXT 5 HIAA 24 HR URINE: NORMAL
EXT 5 HIAA BLOOD: 13 NG/ML (ref 0–22)
EXT ACTH: NORMAL
EXT AFP: NORMAL
EXT ALBUMIN: NORMAL
EXT ALKALINE PHOSPHATASE: NORMAL
EXT ALT: NORMAL
EXT AMYLASE: NORMAL
EXT ANTI ISLET CELL AB: NORMAL
EXT ANTI PARIETAL CELL AB: NORMAL
EXT ANTI THYROID AB: NORMAL
EXT AST: NORMAL
EXT BILIRUBIN DIRECT: NORMAL MG/DL
EXT BILIRUBIN TOTAL: NORMAL
EXT BK VIRUS DNA QN PCR: NORMAL
EXT BUN: NORMAL
EXT C PEPTIDE: NORMAL
EXT CA 125: NORMAL
EXT CA 19-9: 10 U/ML (ref 0–34)
EXT CA 27-29: NORMAL
EXT CALCITONIN: NORMAL
EXT CALCIUM: NORMAL
EXT CEA: 3 NG/ML
EXT CHLORIDE: NORMAL
EXT CHOLESTEROL: NORMAL
EXT CHROMOGRANIN A: 67 NG/ML (ref 25–140)
EXT CO2: NORMAL
EXT CREATININE UA: NORMAL
EXT CREATININE: NORMAL MG/DL
EXT CYCLOSPORONE LEVEL: NORMAL
EXT DOPAMINE: NORMAL
EXT EBV DNA BY PCR: NORMAL
EXT EPINEPHRINE: NORMAL
EXT FOLATE: NORMAL
EXT FREE T3: NORMAL
EXT FREE T4: NORMAL
EXT FSH: NORMAL
EXT GASTRIN RELEASING PEPTIDE: NORMAL
EXT GASTRIN RELEASING PEPTIDE: NORMAL
EXT GASTRIN: NORMAL
EXT GGT: NORMAL
EXT GHRELIN: NORMAL
EXT GLUCAGON: NORMAL
EXT GLUCOSE: NORMAL
EXT GROWTH HORMONE: NORMAL
EXT HCV RNA QUANT PCR: NORMAL
EXT HDL: NORMAL
EXT HEMATOCRIT: NORMAL
EXT HEMOGLOBIN A1C: NORMAL
EXT HEMOGLOBIN: NORMAL
EXT HISTAMINE 24 HR URINE: NORMAL
EXT HISTAMINE: NORMAL
EXT IGF-1: NORMAL
EXT IMMUNKNOW (NON-STIMULATED): NORMAL
EXT IMMUNKNOW (STIMULATED): NORMAL
EXT INR: NORMAL
EXT INSULIN: NORMAL
EXT LANREOTIDE LEVEL: NORMAL
EXT LDH, TOTAL: NORMAL
EXT LDL CHOLESTEROL: NORMAL
EXT LIPASE: NORMAL
EXT MAGNESIUM: NORMAL
EXT METANEPHRINE FREE PLASMA: NORMAL
EXT MOTILIN: NORMAL
EXT NEUROKININ A CAMB: NORMAL
EXT NEUROKININ A ISI: <10 PG/ML (ref 0–40)
EXT NEUROTENSIN: NORMAL
EXT NOREPINEPHRINE: NORMAL
EXT NORMETANEPHRINE: NORMAL
EXT NSE: NORMAL
EXT OCTREOTIDE LEVEL: NORMAL
EXT PANCREASTATIN CAMB: NORMAL
EXT PANCREASTATIN ISI: 59 PG/ML (ref 10–135)
EXT PANCREATIC POLYPEPTIDE: NORMAL
EXT PHOSPHORUS: NORMAL
EXT PLATELETS: NORMAL
EXT POTASSIUM: NORMAL
EXT PROGRAF LEVEL: NORMAL
EXT PROLACTIN: NORMAL
EXT PROTEIN TOTAL: NORMAL
EXT PROTEIN UA: NORMAL
EXT PT: NORMAL
EXT PTH, INTACT: NORMAL
EXT PTT: NORMAL
EXT RAPAMUNE LEVEL: NORMAL
EXT SEROTONIN: 140 NG/ML (ref 56–244)
EXT SODIUM: NORMAL MMOL/L
EXT SOMATOSTATIN: NORMAL
EXT SUBSTANCE P: NORMAL
EXT TRIGLYCERIDES: NORMAL
EXT TRYPTASE: NORMAL
EXT TSH: NORMAL
EXT URIC ACID: NORMAL
EXT URINE AMYLASE U/HR: NORMAL
EXT URINE AMYLASE U/L: NORMAL
EXT VASOACTIVE INTESTINAL POLYPEPTIDE: NORMAL
EXT VITAMIN B12: NORMAL
EXT VMA 24 HR URINE: NORMAL
EXT WBC: NORMAL
NEURON SPECIFIC ENOLASE: NORMAL

## 2018-10-09 PROCEDURE — 99215 OFFICE O/P EST HI 40 MIN: CPT | Performed by: INTERNAL MEDICINE

## 2018-10-09 PROCEDURE — 99214 OFFICE O/P EST MOD 30 MIN: CPT | Mod: ,,, | Performed by: INTERNAL MEDICINE

## 2018-10-09 PROCEDURE — 3008F BODY MASS INDEX DOCD: CPT | Mod: CPTII,,, | Performed by: INTERNAL MEDICINE

## 2018-10-09 RX ORDER — ALLOPURINOL 100 MG/1
TABLET ORAL
Refills: 1 | COMMUNITY
Start: 2018-08-20 | End: 2019-03-08

## 2018-10-09 RX ORDER — VALSARTAN AND HYDROCHLOROTHIAZIDE 160; 25 MG/1; MG/1
TABLET ORAL
Refills: 3 | COMMUNITY
Start: 2018-08-20

## 2018-10-09 NOTE — PROGRESS NOTES
"NOLANETS:  [facility]  A collaboration between [facility] and [facility]    PATIENT: Yvonne Holliday  MRN: [id number]  DATE: 10/9/2018      Diagnosis:   1. Bronchial carcinoid tumors    2. Abnormal CT of liver    3. Adnexal mass        Chief Complaint: Follow-up (review scans)      Oncologic History:      Oncologic History Bronchial carcinoid  diagnosed 10/2005     Oncologic Treatment Sandostatin  Lanreotide 7/2016     Pathology Well differentiated neuroendocrine tumor           Subjective:    Interval History: Ms. Holliday is a 63 y.o. female who returns for follow up for a bronchial carcinoid tumor.  She remains on lanreotide.  She states she generally feels well.  Her breathing is unchanged and she still has intermittent shortness of breath and cough.  She has occasional diarrhea.  She has no other new complaints.    She was previously seen in this clinic by Salomón Jimenez and Atul but has not been seen since 4/2012.  Her history dates to 2005 when she was diagnosed with a bronchial carcinoid after having a chronic cough for a number of years.  She had had an abnormal x-ray which was monitored and had a biopsy in 9/2005 and was diagnosed with a well-differentiated neuroendocrine carcinoma.  Ki-67 demonstrated that the tumor had a low proliferative activity and was described as "a rare tumor cell is positive".  She had followed up at [facility] with Dr. Renan Vick who initially started her on short acting octreotide.  She was seen by Dr. Jimenez in July 2009 and converted to octreotide LAR.  She had a mild improvement in her cough but it also noted an improvement in symptoms of flushing.  She is followed in [facility] by Dr. Garibay.  In 7/2016 she was changed to Lanreotide.      Past Medical History:   Past Medical History:   Diagnosis Date    Abnormal CT of liver 7/15/2016    Adnexal mass 7/15/2016    " Carcinoid syndrome 7/15/2016    Carcinoid tumor of lung 2005    Coughing     Drug therapy     sandostatin    HTN (hypertension)     Malignant carcinoid tumor of bronchus and lung 2016    Spastic paraplegia, hereditary     walks with cane    Wheezing        Past Surgical HIstory:   Past Surgical History:   Procedure Laterality Date    BREAST SURGERY      breast reduction     SECTION         Family History:   Family History   Problem Relation Age of Onset    Cancer Mother         unknown primary    Cancer Maternal Aunt     Cancer Paternal Grandfather         prostate    Cancer Maternal Aunt         gyn       Social History:  reports that  has never smoked. She does not have any smokeless tobacco history on file. She reports that she does not drink alcohol or use drugs.    Allergies:  Review of patient's allergies indicates:   Allergen Reactions    Epinephrine      Neuroendocrine Tumor patient      Pcn [penicillins]        Medications:  Current Outpatient Medications   Medication Sig Dispense Refill    albuterol (PROAIR HFA) 90 mcg/actuation inhaler Inhale 2 puffs into the lungs every 6 (six) hours as needed for Wheezing.      allopurinol (ZYLOPRIM) 100 MG tablet TK 1 T PO  D  1    AMINO ACIDS/MV,FE,MIN (OCUVITE EXTRA ORAL) Take by mouth.      baclofen (LIORESAL) 10 MG tablet 10 mg every evening.   2    budesonide-formoterol 80-4.5 mcg (SYMBICORT) 80-4.5 mcg/actuation HFAA Inhale 2 puffs into the lungs.      cetirizine (ZYRTEC) 10 MG tablet Take 10 mg by mouth once daily.      clonazePAM (KLONOPIN) 0.5 MG tablet Take 0.5 mg by mouth 2 (two) times daily as needed for Anxiety.      cyanocobalamin (VITAMIN B-12) 1000 MCG tablet Take 100 mcg by mouth once daily.      dextromethorphan (DELSYM) 30 mg/5 mL liquid Take 60 mg by mouth nightly as needed for Cough.      diclofenac sodium (VOLTAREN) 1 % Gel Apply 2 g topically 4 (four) times daily.      docusate sodium (COLACE) 100  MG capsule Take 100 mg by mouth every evening.       gabapentin (NEURONTIN) 100 MG capsule Take 100 mg by mouth 2 (two) times daily.      hydrochlorothiazide (HYDRODIURIL) 25 MG tablet as needed.   1    hydrocodone-homatropine 5-1.5 mg/5 ml (HYCODAN) 5-1.5 mg/5 mL Syrp Take 5 mLs by mouth every 4 (four) hours as needed.      latanoprost 0.005 % ophthalmic solution Place 1 drop into both eyes every evening.      loratadine (CLARITIN) 10 mg tablet Take 10 mg by mouth once daily.      methocarbamol (ROBAXIN) 500 MG Tab Take 500 mg by mouth 2 (two) times daily as needed.      multivitamin with minerals tablet Take 1 tablet by mouth once daily.      POTASSIUM CITRATE ORAL Take 99 mg by mouth every morning.      ranitidine (ZANTAC) 75 MG tablet Take 150 mg by mouth nightly.       SOMATULINE DEPOT 120 mg/0.5 mL Syrg Inject 0.5 mLs (120 mg total) into the skin every 28 days. 1 Syringe 5    valsartan-hydrochlorothiazide (DIOVAN-HCT) 160-25 mg per tablet TK 1 T PO  D  3    FLUCELVAX QUAD 5070-6456 60 mcg (15 mcg x 4)/0.5 mL vaccine   0    valsartan-hydrochlorothiazide (DIOVAN-HCT) 160-12.5 mg per tablet 1 tablet once daily.   1     No current facility-administered medications for this visit.        Review of Systems   Constitutional: Positive for fatigue. Negative for chills, fever and unexpected weight change.   HENT: Negative for congestion, hearing loss and nosebleeds.    Eyes: Negative for visual disturbance.   Respiratory: Positive for cough and shortness of breath.    Cardiovascular: Positive for leg swelling. Negative for chest pain and palpitations.   Gastrointestinal: Positive for diarrhea. Negative for abdominal pain, blood in stool, constipation, nausea and vomiting.   Genitourinary: Negative for dysuria.   Musculoskeletal: Positive for arthralgias and myalgias. Negative for back pain and gait problem.   Skin: Negative for color change and rash.        Flushing   Neurological: Negative for dizziness,  "weakness and headaches.   Hematological: Negative for adenopathy. Does not bruise/bleed easily.   Psychiatric/Behavioral: Negative for confusion.       ECOG Performance Status: 1   Objective:      Vitals:   Vitals:    10/09/18 1019   BP: (!) 143/61   Pulse: 74   Temp: 98.9 °F (37.2 °C)   TempSrc: Oral   SpO2: 96%   Weight: 114.9 kg (253 lb 4.9 oz)   Height: 5' 6" (1.676 m)     BMI: Body mass index is 40.89 kg/m².    Physical Exam   Constitutional: She is oriented to person, place, and time. She appears well-developed and well-nourished. No distress.   HENT:   Head: Normocephalic.   Mouth/Throat: No oropharyngeal exudate.   Eyes: EOM are normal. No scleral icterus.   Neck: Neck supple. No tracheal deviation present. No thyromegaly present.   Cardiovascular: Normal rate and regular rhythm.   Pulmonary/Chest: Effort normal and breath sounds normal. No respiratory distress. She has no wheezes. She has no rales.   Abdominal: Soft. She exhibits no distension and no mass. There is no tenderness. There is no rebound and no guarding.   Musculoskeletal: Normal range of motion. She exhibits edema.   Lymphadenopathy:     She has no cervical adenopathy.   Neurological: She is alert and oriented to person, place, and time. No cranial nerve deficit.   Skin: Skin is warm and dry.   Psychiatric: She has a normal mood and affect.       Laboratory Data:  No visits with results within 1 Month(s) from this visit.   Latest known visit with results is:   Abstract on 08/21/2017   Component Date Value Ref Range Status    EXT BUN 08/21/2017 12  5 - 18 Final    EXT Creatinine 08/21/2017 1.0  0.6 - 1.3 mg/dL Final       Imaging:   CT from 11/2016 reviewed with patient             Assessment:       1. Bronchial carcinoid tumors    2. Abnormal CT of liver    3. Adnexal mass           Plan:     Mrs. Holliday continues on with Lanreotide and tolerating well. I have reviewed the findings of her CT scan which indicated overall stability of her " disease within her lung, however, she does have an enlarging adnexal mass which is enlarged significantly over the last several years.  She states that she did see GYN who stated that this was a benign condition and did not want to work this up further.  Given the fact that this is increasing in size I will plan to send her for a CEA and .  If these are elevated I would favor sending her to GYN Oncology for further evaluation.  I will discuss her case in our multidisciplinary neuroendocrine tumor board and contact her with further recommendations.  All questions were answered and she is agreeable with this plan.    Lobo Smith DO, FACP  Hematology & Oncology, [facility]  [address], [address]  [place]  [address]  ph. [phone]; [phone]  fax. [phone]    25 minutes were spent in coordination of patient's care, record review and counseling.  More than 50% of the time was face-to-face.

## 2018-10-09 NOTE — PATIENT INSTRUCTIONS
Labs today downstairs on the 3rd floor at [facility].     Follow up in 6 months with gallium scan

## 2018-10-15 ENCOUNTER — CONFERENCE (OUTPATIENT)
Dept: NEUROLOGY | Facility: HOSPITAL | Age: 63
End: 2018-10-15

## 2018-10-15 NOTE — TELEPHONE ENCOUNTER
[facility]      NEUROENDOCRINE TUMOR MULTIDISCIPLINARY TUMOR BOARD  _____________________________________________________________________    PRESENTER:   Lobo Smith DO    REASON FOR PRESENTATION:  Treatment Plan and Scan Review    ATTENDEES:   Surgery:              MD MEMO Patricio MD T. Ramcharan, MD  Interventional Radiology - Juan Phoenix MD   Pathology -   Oncology - Lobo Smith DO  Gastroenterology - Not present   Nursing  Research    PATIENT STATUS:  Established Patient      PATIENT SUMMARY:  Past Medical History:   Diagnosis Date    Abnormal CT of liver 7/15/2016    Adnexal mass 7/15/2016    Carcinoid syndrome 7/15/2016    Carcinoid tumor of lung 2005    Coughing     Drug therapy     sandostatin    HTN (hypertension)     Malignant carcinoid tumor of bronchus and lung 2016    Spastic paraplegia, hereditary     walks with cane    Wheezing        Past Surgical History:   Procedure Laterality Date    BREAST SURGERY      breast reduction     SECTION       ________________________________________________________________    DISCUSSION:  Stable pulm mets and hypoattenuating liver lesions. Large left adnexal mass, difficult to separate from the uninary bladder but still present.   Not surgical candidate for lungs    CEA and CA-19-9 normal       BOARD RECOMMENDATIONS:   Get CA-125  Refer to GYN-ONC for enlarging ovarian mass - recommend pelvic US to be ordered by GYN-ONC

## 2018-10-16 ENCOUNTER — TELEPHONE (OUTPATIENT)
Dept: NEUROLOGY | Facility: HOSPITAL | Age: 63
End: 2018-10-16

## 2018-10-16 DIAGNOSIS — N94.9 ADNEXAL CYST: Primary | ICD-10-CM

## 2018-10-16 NOTE — TELEPHONE ENCOUNTER
"Pt given tumor board recommendations.  Pt not ready to see gyn/onc due to financial reasons. Pt states "I make 64 next August and will have medicare and supplemental insurance.  I have a lot of medical bills and I am on a fixed income"  Pt notified mass may grow in size, pt states "I'm aware possible growth, but I can afford to keep paying all these medical bills".  Pt notified lab orders will be placed in the mail, and referral will be made to gyn/onc at [facility], she can make appointment when ready.  Pt acknowledged understanding.         "

## 2018-10-24 LAB
EXT 24 HR UR METANEPHRINE: ABNORMAL
EXT 24 HR UR NORMETANEPHRINE: ABNORMAL
EXT 24 HR UR NORMETANEPHRINE: ABNORMAL
EXT 25 HYDROXY VIT D2: ABNORMAL
EXT 25 HYDROXY VIT D3: ABNORMAL
EXT 5 HIAA 24 HR URINE: ABNORMAL
EXT 5 HIAA BLOOD: ABNORMAL
EXT ACTH: ABNORMAL
EXT AFP: ABNORMAL
EXT ALBUMIN: ABNORMAL
EXT ALKALINE PHOSPHATASE: ABNORMAL
EXT ALT: ABNORMAL
EXT AMYLASE: ABNORMAL
EXT ANTI ISLET CELL AB: ABNORMAL
EXT ANTI PARIETAL CELL AB: ABNORMAL
EXT ANTI THYROID AB: ABNORMAL
EXT AST: ABNORMAL
EXT BILIRUBIN DIRECT: ABNORMAL MG/DL
EXT BILIRUBIN TOTAL: ABNORMAL
EXT BK VIRUS DNA QN PCR: ABNORMAL
EXT BUN: ABNORMAL
EXT C PEPTIDE: ABNORMAL
EXT CA 125: 46 U/ML (ref 0–38.1)
EXT CA 19-9: ABNORMAL
EXT CA 27-29: ABNORMAL
EXT CALCITONIN: ABNORMAL
EXT CALCIUM: ABNORMAL
EXT CEA: ABNORMAL
EXT CHLORIDE: ABNORMAL
EXT CHOLESTEROL: ABNORMAL
EXT CHROMOGRANIN A: ABNORMAL
EXT CO2: ABNORMAL
EXT CREATININE UA: ABNORMAL
EXT CREATININE: ABNORMAL MG/DL
EXT CYCLOSPORONE LEVEL: ABNORMAL
EXT DOPAMINE: ABNORMAL
EXT EBV DNA BY PCR: ABNORMAL
EXT EPINEPHRINE: ABNORMAL
EXT FOLATE: ABNORMAL
EXT FREE T3: ABNORMAL
EXT FREE T4: ABNORMAL
EXT FSH: ABNORMAL
EXT GASTRIN RELEASING PEPTIDE: ABNORMAL
EXT GASTRIN RELEASING PEPTIDE: ABNORMAL
EXT GASTRIN: ABNORMAL
EXT GGT: ABNORMAL
EXT GHRELIN: ABNORMAL
EXT GLUCAGON: ABNORMAL
EXT GLUCOSE: ABNORMAL
EXT GROWTH HORMONE: ABNORMAL
EXT HCV RNA QUANT PCR: ABNORMAL
EXT HDL: ABNORMAL
EXT HEMATOCRIT: ABNORMAL
EXT HEMOGLOBIN A1C: ABNORMAL
EXT HEMOGLOBIN: ABNORMAL
EXT HISTAMINE 24 HR URINE: ABNORMAL
EXT HISTAMINE: ABNORMAL
EXT IGF-1: ABNORMAL
EXT IMMUNKNOW (NON-STIMULATED): ABNORMAL
EXT IMMUNKNOW (STIMULATED): ABNORMAL
EXT INR: ABNORMAL
EXT INSULIN: ABNORMAL
EXT LANREOTIDE LEVEL: ABNORMAL
EXT LDH, TOTAL: ABNORMAL
EXT LDL CHOLESTEROL: ABNORMAL
EXT LIPASE: ABNORMAL
EXT MAGNESIUM: ABNORMAL
EXT METANEPHRINE FREE PLASMA: ABNORMAL
EXT MOTILIN: ABNORMAL
EXT NEUROKININ A CAMB: ABNORMAL
EXT NEUROKININ A ISI: ABNORMAL
EXT NEUROTENSIN: ABNORMAL
EXT NOREPINEPHRINE: ABNORMAL
EXT NORMETANEPHRINE: ABNORMAL
EXT NSE: ABNORMAL
EXT OCTREOTIDE LEVEL: ABNORMAL
EXT PANCREASTATIN CAMB: ABNORMAL
EXT PANCREASTATIN ISI: ABNORMAL
EXT PANCREATIC POLYPEPTIDE: ABNORMAL
EXT PHOSPHORUS: ABNORMAL
EXT PLATELETS: ABNORMAL
EXT POTASSIUM: ABNORMAL
EXT PROGRAF LEVEL: ABNORMAL
EXT PROLACTIN: ABNORMAL
EXT PROTEIN TOTAL: ABNORMAL
EXT PROTEIN UA: ABNORMAL
EXT PT: ABNORMAL
EXT PTH, INTACT: ABNORMAL
EXT PTT: ABNORMAL
EXT RAPAMUNE LEVEL: ABNORMAL
EXT SEROTONIN: ABNORMAL
EXT SODIUM: ABNORMAL MMOL/L
EXT SOMATOSTATIN: ABNORMAL
EXT SUBSTANCE P: ABNORMAL
EXT TRIGLYCERIDES: ABNORMAL
EXT TRYPTASE: ABNORMAL
EXT TSH: ABNORMAL
EXT URIC ACID: ABNORMAL
EXT URINE AMYLASE U/HR: ABNORMAL
EXT URINE AMYLASE U/L: ABNORMAL
EXT VASOACTIVE INTESTINAL POLYPEPTIDE: ABNORMAL
EXT VITAMIN B12: ABNORMAL
EXT VMA 24 HR URINE: ABNORMAL
EXT WBC: ABNORMAL
NEURON SPECIFIC ENOLASE: ABNORMAL

## 2018-10-28 ENCOUNTER — PATIENT MESSAGE (OUTPATIENT)
Dept: NEUROLOGY | Facility: HOSPITAL | Age: 63
End: 2018-10-28

## 2018-10-30 ENCOUNTER — PATIENT MESSAGE (OUTPATIENT)
Dept: NEUROLOGY | Facility: HOSPITAL | Age: 63
End: 2018-10-30

## 2018-11-01 ENCOUNTER — TELEPHONE (OUTPATIENT)
Dept: HEMATOLOGY/ONCOLOGY | Facility: CLINIC | Age: 63
End: 2018-11-01

## 2018-11-01 ENCOUNTER — PATIENT MESSAGE (OUTPATIENT)
Dept: NEUROLOGY | Facility: HOSPITAL | Age: 63
End: 2018-11-01

## 2018-11-01 DIAGNOSIS — N83.8 OVARIAN MASS: Primary | ICD-10-CM

## 2018-11-01 NOTE — TELEPHONE ENCOUNTER
Called patient to let her know the appointment that was scheduled for 11/8 was canceled. Spoke to Dr Harrison's office they will call the patient to schedule consult appointment. Patient verbalized understanding.

## 2018-11-09 ENCOUNTER — PATIENT MESSAGE (OUTPATIENT)
Dept: NEUROLOGY | Facility: HOSPITAL | Age: 63
End: 2018-11-09

## 2018-11-28 ENCOUNTER — PATIENT MESSAGE (OUTPATIENT)
Dept: NEUROLOGY | Facility: HOSPITAL | Age: 63
End: 2018-11-28

## 2018-11-29 ENCOUNTER — DOCUMENTATION ONLY (OUTPATIENT)
Dept: GYNECOLOGIC ONCOLOGY | Facility: CLINIC | Age: 63
End: 2018-11-29

## 2018-11-29 DIAGNOSIS — N94.89 ADNEXAL MASS: ICD-10-CM

## 2018-11-29 DIAGNOSIS — N94.89 ADNEXAL MASS: Primary | ICD-10-CM

## 2018-11-29 NOTE — PROGRESS NOTES
"Referring physician: Dr. Lobo Smith  Reason for referral: left adnexal mass. History of bronchial carcinoid      Treatment history:    Sept 2005:  Diagnosed with a bronchial carcinoid after having a chronic cough for a number of years.     She had an abnormal x-ray which was monitored and had a biopsy in 9/2005 and was diagnosed with a well-differentiated neuroendocrine carcinoma.  Ki-67 demonstrated that the tumor had a low proliferative activity and was described as "a rare tumor cell is positive".  She had followed up at [facility] with Dr. Renan Vick who initially started her on short acting octreotide.  She was seen by Dr. Jimenez in July 2009 and converted to octreotide LAR.  She had a mild improvement in her cough but it also noted an improvement in symptoms of flushing.  She is followed in [facility] by Dr. Garibay.  In 7/2016 she was changed to Lanreotide.    Previously seen in this clinic by Salomón Jimenez and Atul but has not been seen since 4/2012.      Recently seen by Dr. Smith for follow up.  Because of enlarging left adnexal masses  was obtained. This is elevated at 46.  Patient has been referred for further management.    CT scans from [facility] have identified a left adnexal mass.  Previously measuring 8.1 x 6.3 x 6.7 cm in 2016.     August 2017 left adnexal mass measured 9.5 x 7 x 7.1 cm.    September 2018:  CT scan showed of the left adnexal mass to now measure 11.8 x 8.1 x 7.3 cm.  Nonvisualization of the right ovary.  Trace pelvic free fluid.  No sidewall adenopathy. Previously the uterus was normal limits.     was obtained at outside laboratory and is elevated at 46 (0-38.1 units/ml)    Review of outside records reveals that this left adnexal mass was 1st noted in October 2014.  According to the referral notes this was approximately 3 cm.  At time of follow-up in July 2015 she reported that the measurement was 5.2 x 4.1 x 4.1 cm.  No " report to view.  was normal at 11 (less than 35 units/ml).

## 2018-12-09 ENCOUNTER — PATIENT MESSAGE (OUTPATIENT)
Dept: GYNECOLOGIC ONCOLOGY | Facility: CLINIC | Age: 63
End: 2018-12-09

## 2018-12-10 ENCOUNTER — TELEPHONE (OUTPATIENT)
Dept: GYNECOLOGIC ONCOLOGY | Facility: CLINIC | Age: 63
End: 2018-12-10

## 2018-12-11 ENCOUNTER — INITIAL CONSULT (OUTPATIENT)
Dept: GYNECOLOGIC ONCOLOGY | Facility: CLINIC | Age: 63
End: 2018-12-11
Payer: COMMERCIAL

## 2018-12-11 ENCOUNTER — HOSPITAL ENCOUNTER (OUTPATIENT)
Dept: RADIOLOGY | Facility: HOSPITAL | Age: 63
Discharge: HOME | End: 2018-12-11
Attending: OBSTETRICS & GYNECOLOGY
Payer: COMMERCIAL

## 2018-12-11 VITALS
WEIGHT: 243.38 LBS | SYSTOLIC BLOOD PRESSURE: 135 MMHG | HEART RATE: 84 BPM | BODY MASS INDEX: 39.12 KG/M2 | DIASTOLIC BLOOD PRESSURE: 62 MMHG | HEIGHT: 66 IN

## 2018-12-11 DIAGNOSIS — D3A.090 BRONCHIAL CARCINOID TUMORS: ICD-10-CM

## 2018-12-11 DIAGNOSIS — N94.89 ADNEXAL MASS: Primary | ICD-10-CM

## 2018-12-11 DIAGNOSIS — N94.89 ADNEXAL MASS: ICD-10-CM

## 2018-12-11 PROCEDURE — 76856 US EXAM PELVIC COMPLETE: CPT | Mod: 26,,, | Performed by: RADIOLOGY

## 2018-12-11 PROCEDURE — 76830 TRANSVAGINAL US NON-OB: CPT | Mod: 26,,, | Performed by: RADIOLOGY

## 2018-12-11 PROCEDURE — 76856 US EXAM PELVIC COMPLETE: CPT | Mod: TC

## 2018-12-11 PROCEDURE — 76830 TRANSVAGINAL US NON-OB: CPT | Mod: TC

## 2018-12-11 PROCEDURE — 99205 OFFICE O/P NEW HI 60 MIN: CPT | Mod: S$GLB,,, | Performed by: OBSTETRICS & GYNECOLOGY

## 2018-12-11 PROCEDURE — 99999 PR PBB SHADOW E&M-EST. PATIENT-LVL IV: CPT | Mod: PBBFAC,,, | Performed by: OBSTETRICS & GYNECOLOGY

## 2018-12-11 NOTE — PROGRESS NOTES
"Subjective:       Patient ID: Yvonne Holliday is a 63 y.o. female.    Chief Complaint: Advice Only (Dr. Gorman) and adenal mass    HPI     Referring physician: Dr. Lobo Smith  Reason for referral: left adnexal mass. History of bronchial carcinoid        Treatment history:    Sept 2005:  Diagnosed with a bronchial carcinoid after having a chronic cough for a number of years.     She had an abnormal x-ray which was monitored and had a biopsy in 9/2005 and was diagnosed with a well-differentiated neuroendocrine carcinoma.  Ki-67 demonstrated that the tumor had a low proliferative activity and was described as "a rare tumor cell is positive".  She had followed up at [facility] with Dr. Renan Vick who initially started her on short acting octreotide.  She was seen by Dr. Jimenez in July 2009 and converted to octreotide LAR.  She had a mild improvement in her cough but it also noted an improvement in symptoms of flushing.  She is followed in [facility] by Dr. Garibay.  In 7/2016 she was changed to Lanreotide.     Previously seen in this clinic by Salomón Jimenez and Atul but has not been seen since 4/2012.       Recently seen by Dr. Smith for follow up.  Because of enlarging left adnexal masses  was obtained. This is elevated at 46.  Patient has been referred for further management.     CT scans from [facility] have identified a left adnexal mass.  Previously measuring 8.1 x 6.3 x 6.7 cm in 2016.      August 2017 left adnexal mass measured 9.5 x 7 x 7.1 cm.     September 2018:  CT scan showed of the left adnexal mass to now measure 11.8 x 8.1 x 7.3 cm.  Nonvisualization of the right ovary.  Trace pelvic free fluid.  No sidewall adenopathy. Previously the uterus was normal limits.      was obtained at outside laboratory and is elevated at 46 (0-38.1 units/ml)     Review of outside records reveals that this left adnexal mass was 1st noted in October 2014.  According " to the referral notes this was approximately 3 cm.  At time of follow-up in 2015 she reported that the measurement was 5.2 x 4.1 x 4.1 cm.  No report to view.  was normal at 11 (less than 35 units/ml).    Can only walk 20 feet before becoming SOB.   Sleeps on 5 pillows.            Past Medical History:   Diagnosis Date    Abnormal CT of liver 7/15/2016    Adnexal mass 7/15/2016    Asthma     Carcinoid syndrome 7/15/2016    Carcinoid tumor of lung 2005    Coughing     Drug therapy     sandostatin    HTN (hypertension)     Malignant carcinoid tumor of bronchus and lung 2016    Spastic paraplegia, hereditary     walks with cane    Wheezing      Past Surgical History:   Procedure Laterality Date    BREAST SURGERY      breast reduction     SECTION       Family History   Problem Relation Age of Onset    Cancer Mother         unknown primary    Cancer Maternal Aunt         gyn of some type    Cancer Paternal Grandfather         prostate    Cancer Maternal Aunt         bone cancer     Breast cancer Neg Hx     Colon cancer Neg Hx      Social History     Tobacco Use    Smoking status: Never Smoker   Substance Use Topics    Alcohol use: No    Drug use: No     Review of patient's allergies indicates:   Allergen Reactions    Epinephrine      Neuroendocrine Tumor patient      Pcn [penicillins]        Current Outpatient Medications:     albuterol (PROAIR HFA) 90 mcg/actuation inhaler, Inhale 2 puffs into the lungs every 6 (six) hours as needed for Wheezing., Disp: , Rfl:     allopurinol (ZYLOPRIM) 100 MG tablet, TK 1 T PO  D, Disp: , Rfl: 1    AMINO ACIDS/MV,FE,MIN (OCUVITE EXTRA ORAL), Take by mouth., Disp: , Rfl:     baclofen (LIORESAL) 10 MG tablet, 10 mg every evening. , Disp: , Rfl: 2    budesonide-formoterol 80-4.5 mcg (SYMBICORT) 80-4.5 mcg/actuation HFAA, Inhale 2 puffs into the lungs., Disp: , Rfl:     cetirizine (ZYRTEC) 10 MG tablet, Take 10 mg by mouth once  daily., Disp: , Rfl:     clonazePAM (KLONOPIN) 0.5 MG tablet, Take 0.5 mg by mouth 2 (two) times daily as needed for Anxiety., Disp: , Rfl:     cyanocobalamin (VITAMIN B-12) 1000 MCG tablet, Take 100 mcg by mouth once daily., Disp: , Rfl:     dextromethorphan (DELSYM) 30 mg/5 mL liquid, Take 60 mg by mouth nightly as needed for Cough., Disp: , Rfl:     diclofenac sodium (VOLTAREN) 1 % Gel, Apply 2 g topically 4 (four) times daily., Disp: , Rfl:     docusate sodium (COLACE) 100 MG capsule, Take 100 mg by mouth every evening. , Disp: , Rfl:     FLUCELVAX QUAD 0255-2485 60 mcg (15 mcg x 4)/0.5 mL vaccine, , Disp: , Rfl: 0    gabapentin (NEURONTIN) 100 MG capsule, Take 100 mg by mouth 2 (two) times daily., Disp: , Rfl:     hydrochlorothiazide (HYDRODIURIL) 25 MG tablet, as needed. , Disp: , Rfl: 1    hydrocodone-homatropine 5-1.5 mg/5 ml (HYCODAN) 5-1.5 mg/5 mL Syrp, Take 5 mLs by mouth every 4 (four) hours as needed., Disp: , Rfl:     latanoprost 0.005 % ophthalmic solution, Place 1 drop into both eyes every evening., Disp: , Rfl:     loratadine (CLARITIN) 10 mg tablet, Take 10 mg by mouth once daily., Disp: , Rfl:     methocarbamol (ROBAXIN) 500 MG Tab, Take 500 mg by mouth 2 (two) times daily as needed., Disp: , Rfl:     multivitamin with minerals tablet, Take 1 tablet by mouth once daily., Disp: , Rfl:     POTASSIUM CITRATE ORAL, Take 99 mg by mouth every morning., Disp: , Rfl:     ranitidine (ZANTAC) 75 MG tablet, Take 150 mg by mouth nightly. , Disp: , Rfl:     SOMATULINE DEPOT 120 mg/0.5 mL Syrg, Inject 0.5 mLs (120 mg total) into the skin every 28 days., Disp: 1 Syringe, Rfl: 5    valsartan-hydrochlorothiazide (DIOVAN-HCT) 160-12.5 mg per tablet, 1 tablet once daily. , Disp: , Rfl: 1    valsartan-hydrochlorothiazide (DIOVAN-HCT) 160-25 mg per tablet, TK 1 T PO  D, Disp: , Rfl: 3    Review of Systems   Constitutional: Negative for chills, fatigue and fever.   Respiratory: Positive for cough  "(chronic ) and wheezing (chronic ). Negative for shortness of breath.    Cardiovascular: Negative for chest pain, palpitations and leg swelling.   Gastrointestinal: Negative for abdominal pain, constipation, diarrhea, nausea and vomiting.   Genitourinary: Positive for vaginal bleeding. Negative for difficulty urinating, dysuria, frequency, genital sores, hematuria, urgency, vaginal discharge and vaginal pain.   Musculoskeletal: Positive for back pain (djd).   Neurological: Negative for weakness.   Hematological: Negative for adenopathy. Does not bruise/bleed easily.   Psychiatric/Behavioral: The patient is not nervous/anxious.        Objective:   /62   Pulse 84   Ht 5' 6" (1.676 m)   Wt 110.4 kg (243 lb 6.2 oz)   BMI 39.28 kg/m²      Physical Exam   Constitutional: She is oriented to person, place, and time. She appears well-developed and well-nourished.   Obese     HENT:   Head: Normocephalic and atraumatic.   Eyes: No scleral icterus.   Neck: Neck supple. No tracheal deviation present. No thyroid mass and no thyromegaly present.   Cardiovascular: Normal rate and regular rhythm.   Pulmonary/Chest: Effort normal and breath sounds normal. She has no wheezes.   Had coughing spell when asked to take deep breaths.   Poor respiratory effort. Moves very little air.    Abdominal: Soft. She exhibits no distension and no mass. There is no hepatosplenomegaly. There is no tenderness. There is no rebound and no guarding. No hernia.   Large panis.    Genitourinary:   Genitourinary Comments: Bimanual exam:  Vulva: no lesions. Normal appearance  Urethra: Normal size and location. No lesions  Bladder: No masses or tenderness.  Vagina: normal mucosa. No lesion  Cervix: normal. Difficult to see because of narrow vagina.   Uterus: difficult to palpate due to obesity.   Adnexa: no masses.  Rectovaginal: No posterior cul de sac thickening or nodularity.  Rectal: no masses. Nontender. Normal tone.       Suboptimal exam due to " obesity.    Musculoskeletal: She exhibits no edema or tenderness.   Lymphadenopathy:     She has no cervical adenopathy.     She has no axillary adenopathy.        Right: No inguinal and no supraclavicular adenopathy present.        Left: No inguinal and no supraclavicular adenopathy present.   Neurological: She is alert and oriented to person, place, and time.   Skin: Skin is warm and dry.   Psychiatric: She has a normal mood and affect. Her behavior is normal. Judgment and thought content normal.       Assessment:       1. Adnexal mass    2. Bronchial carcinoid tumors        Plan:   Adnexal mass  Patient is not a good surgical candidate from a respiratory standpoint.  She had coughing spell after and being asked to do deep breathing.  He is only able to walk 20 ft before becoming short of breath.  She sleeps on 5 pillows and only on her left side.    My concerns are that this mass is related to metastatic carcinoid.  I have recommended a CT scan of the chest abdomen and pelvis to be done here so that this can be better evaluated from the possibility of an IR biopsy of the adnexal mass.    If the mass shows metastatic carcinoid then I am not certain that there is a role for resection.    The would seem to me that she has progressive disease on her present medication and that other alternatives would have to be considered.    Message has been sent to Dr. Lobo Smith in regards to this.    I do not think that she has a good robotic surgical candidate due to her obesity and respiratory status.  I do not think she would tolerate a pneumoperitoneum or deep Trendelenburg.    Open hysterectomy would be feasible but with increase postoperative respiratory compromise.    -     CT Abdomen Pelvis With Contrast; Future; Expected date: 12/11/2018  -     Basic metabolic panel; Standing  -     ; Future; Expected date: 12/11/2018    Bronchial carcinoid tumors  -     CT Chest With Contrast; Future; Expected date:  12/11/2018      I will call her with results of the CT scan and after I discuss further her care with Dr. Smith.

## 2018-12-11 NOTE — LETTER
December 11, 2018      Lobo Smith, , FACP  [address] [facility] [address]           [facility] - GYN Oncology  [address]  [place] [address]  Phone: [phone]          Patient: Yvonne Holliday   MR Number: [id number]   YOB: 1955   Date of Visit: 12/11/2018       Dear Dr. Lobo Smith:    Thank you for referring Yvonne Holliday to me for evaluation. Attached you will find relevant portions of my assessment and plan of care.    If you have questions, please do not hesitate to call me. I look forward to following Yvonne Holliday along with you.    Sincerely,    Regan Harrison MD    Enclosure  CC:  No Recipients    If you would like to receive this communication electronically, please contact [email or web address] or [phone] to request more information on [facility] [facility] access.    For providers and/or their staff who would like to refer a patient to [facility], please contact us through our one-stop-shop provider referral line, [facility], at [phone].    If you feel you have received this communication in error or would no longer like to receive these types of communications, please e-mail [email or web address]

## 2018-12-12 ENCOUNTER — RESEARCH ENCOUNTER (OUTPATIENT)
Dept: RESEARCH | Facility: HOSPITAL | Age: 63
End: 2018-12-12

## 2018-12-12 NOTE — PROGRESS NOTES
The patient and her sister were approached by me in Gyn Onc Clinic yesterday regarding participation in [facility] ovarian blood and tissue study (IRB#[id number]). They were agreeable.    The Informed Consent Form (ICF) was reviewed with pt and her sister. The discussion included:    - participation is voluntary and will not prohibit her from participating in future research studies;  - the blood specimen (up to 4 x 10 ml) will be collected pre-operatively, if possible;    - tissue will be collected from Pathology after routine tests have been conducted;   - pt can change her mind about participating at any time;  - if she changes her mind about participation, she can call us at contact info in the ICF, and we will discard samples remaining;  - samples that have been used prior to her notification will still be included in research;  - specimens will be stored with unique code that can only be linked to pt by [facility] staff;  - all medical information released to researchers will be stripped of identifiers;  - samples will not be released to outside researchers unless approved by internal committee;  - there is a small risk of loss of confidentiality, but we make every effort to ensure privacy;  - no other physical risks outside of those involved in standard of care procedure.    Dr. Harrison approved of the patient's participation and will continue to take care of the patient per his usual protocol - participation in [facility] program will not change the patient's present or future medical care. Pt did not have any questions. Pt willingly and independently signed the ICF. A copy of the signed ICF and my business card were given to pt with instructions to call with any questions that may arise or if she should change her mind regarding participation in [facility] program.

## 2018-12-14 ENCOUNTER — TELEPHONE (OUTPATIENT)
Dept: GYNECOLOGIC ONCOLOGY | Facility: CLINIC | Age: 63
End: 2018-12-14

## 2018-12-14 NOTE — TELEPHONE ENCOUNTER
----- Message from Regan Harrison MD sent at 12/14/2018  3:23 PM CST -----  Discussed US and  with her. Has CT scheduled. May need open AUDREY/BSO

## 2018-12-15 ENCOUNTER — HOSPITAL ENCOUNTER (OUTPATIENT)
Dept: RADIOLOGY | Facility: HOSPITAL | Age: 63
Discharge: HOME | End: 2018-12-15
Attending: OBSTETRICS & GYNECOLOGY
Payer: COMMERCIAL

## 2018-12-15 DIAGNOSIS — N94.89 ADNEXAL MASS: ICD-10-CM

## 2018-12-15 DIAGNOSIS — D3A.090 BRONCHIAL CARCINOID TUMORS: ICD-10-CM

## 2018-12-15 PROCEDURE — 25500020 PHARM REV CODE 255: Performed by: OBSTETRICS & GYNECOLOGY

## 2018-12-15 PROCEDURE — 71260 CT THORAX DX C+: CPT | Mod: 26,,, | Performed by: RADIOLOGY

## 2018-12-15 PROCEDURE — 71260 CT THORAX DX C+: CPT | Mod: TC

## 2018-12-15 PROCEDURE — 74177 CT ABD & PELVIS W/CONTRAST: CPT | Mod: 26,,, | Performed by: RADIOLOGY

## 2018-12-15 PROCEDURE — 74177 CT ABD & PELVIS W/CONTRAST: CPT | Mod: TC

## 2018-12-15 RX ADMIN — IOHEXOL 15 ML: 350 INJECTION, SOLUTION INTRAVENOUS at 10:12

## 2018-12-15 RX ADMIN — IOHEXOL 100 ML: 350 INJECTION, SOLUTION INTRAVENOUS at 12:12

## 2018-12-15 RX ADMIN — IOHEXOL 15 ML: 350 INJECTION, SOLUTION INTRAVENOUS at 11:12

## 2019-01-07 ENCOUNTER — TELEPHONE (OUTPATIENT)
Dept: GYNECOLOGIC ONCOLOGY | Facility: CLINIC | Age: 64
End: 2019-01-07

## 2019-01-08 ENCOUNTER — TELEPHONE (OUTPATIENT)
Dept: GYNECOLOGIC ONCOLOGY | Facility: CLINIC | Age: 64
End: 2019-01-08

## 2019-01-08 ENCOUNTER — TELEPHONE (OUTPATIENT)
Dept: PREADMISSION TESTING | Facility: HOSPITAL | Age: 64
End: 2019-01-08

## 2019-01-08 DIAGNOSIS — N94.89 ADNEXAL MASS: Primary | ICD-10-CM

## 2019-01-08 NOTE — TELEPHONE ENCOUNTER
----- Message from Allyson Wallace sent at 1/8/2019  2:49 PM CST -----  RE: EMMETT MCNAIR [[id number]]     Ms Mcnair has been scheduled to see Dr Harrison 1/22/19 @ 2 pm   Ms Mcnair has pelvic mass    Dr Harrison needs pre op to please schedule Ms Mcnair for pre op visit & with anesthesia same day--1/22/19 before 1 pm      Please mail an appt slip to Ms Mcnari when appt has been made     Thank you

## 2019-01-09 ENCOUNTER — PATIENT MESSAGE (OUTPATIENT)
Dept: GYNECOLOGIC ONCOLOGY | Facility: CLINIC | Age: 64
End: 2019-01-09

## 2019-01-11 ENCOUNTER — TELEPHONE (OUTPATIENT)
Dept: GYNECOLOGIC ONCOLOGY | Facility: CLINIC | Age: 64
End: 2019-01-11

## 2019-01-11 NOTE — TELEPHONE ENCOUNTER
Spoke to patient regarding need to see Dr. Harrison when she has her pre-op appt. Explained to pt that she needs to see Dr. Harrison according to Estelita and pt appt time changed to earlier time so she can be accommodated by pre-op when they call her to schedule.  Pt expressed understanding and pre-op informed surgery date of 2/4/19 has been entered. Informed pt to follow up with our office on 1/15/19 if pre-op hasn't reached out to her.

## 2019-01-13 ENCOUNTER — PATIENT MESSAGE (OUTPATIENT)
Dept: GYNECOLOGIC ONCOLOGY | Facility: CLINIC | Age: 64
End: 2019-01-13

## 2019-01-16 ENCOUNTER — ANESTHESIA EVENT (OUTPATIENT)
Dept: SURGERY | Facility: HOSPITAL | Age: 64
DRG: 742 | End: 2019-01-16
Payer: COMMERCIAL

## 2019-01-16 ENCOUNTER — PATIENT MESSAGE (OUTPATIENT)
Dept: NEUROLOGY | Facility: HOSPITAL | Age: 64
End: 2019-01-16

## 2019-01-16 DIAGNOSIS — Z01.818 PREOPERATIVE TESTING: Primary | ICD-10-CM

## 2019-01-16 NOTE — PRE ADMISSION SCREENING
Anesthesia Assessment: Preoperative EQUATION    Planned Procedure: Procedure(s) (LRB):  HYSTERECTOMY, TOTAL, ABDOMINAL (N/A)  SALPINGO-OOPHORECTOMY (Bilateral)  Requested Anesthesia Type:General  Surgeon: Regan Harrison MD  Service: Oncology  Known or anticipated Date of Surgery:2/4/2019    Surgeon notes: reviewed    Previous anesthesia records:Not available    Last PCP note: No PCP  Subspecialty notes: Hematology/Oncology, Neuroendocrine    Other important co-morbidities: Adnexal Mass, Bronchial Carcinoid tumors, Asthma, HTN     Tests already available:  [facility],  [facility] . 12/11/2018 BMP; 7/2016 2D echo            Instructions given. (See in Nurse's note)    Optimization:  Anesthesia Preop Clinic Assessment  Indicated.    Medical Opinion Indicated.      Plan:    Testing:  Hematology Profile, T&S and EKG   Pre-anesthesia  visit       Visit focus: concerns in complex and/or prolonged anesthesia, airway concerns due to multiple bronchial carcinoid tumors     Consultation:IM Perioperative Hospitalist     Patient  has previously scheduled Medical Appointment: 1/22/2019    Navigation: Tests Scheduled.              Consults scheduled.             Results will be tracked by Preop Clinic.

## 2019-01-16 NOTE — ANESTHESIA PREPROCEDURE EVALUATION
Christin Jaime, RN   Registered Nurse      Pre Admission Screening   Signed                     Anesthesia Assessment: Preoperative EQUATION     Planned Procedure: Procedure(s) (LRB):  HYSTERECTOMY, TOTAL, ABDOMINAL (N/A)  SALPINGO-OOPHORECTOMY (Bilateral)  Requested Anesthesia Type:General  Surgeon: Regan Harrison MD  Service: Oncology  Known or anticipated Date of Surgery:2/4/2019     Surgeon notes: reviewed     Previous anesthesia records:Not available     Last PCP note: No PCP  Subspecialty notes: Hematology/Oncology, Neuroendocrine     Other important co-morbidities: Adnexal Mass, Bronchial Carcinoid tumors, Asthma, HTN     Tests already available:  Available tests,  within 3 months , within [facility] . 12/11/2018 BMP; 7/2016 2D echo                            Instructions given. (See in Nurse's note)     Optimization:  Anesthesia Preop Clinic Assessment  Indicated.    Medical Opinion Indicated.                Plan:    Testing:  Hematology Profile, T&S and EKG   Pre-anesthesia  visit                                        Visit focus: concerns in complex and/or prolonged anesthesia, airway concerns due to multiple bronchial carcinoid tumors                           Consultation:IM Perioperative Hospitalist                           Patient  has previously scheduled Medical Appointment: 1/22/2019     Navigation: Tests Scheduled.                         Consults scheduled.                        Results will be tracked by Preop Clinic.                                                                                                                           01/16/2019  Yvonne Holliday is a 63 y.o., female.    Anesthesia Evaluation    I have reviewed the Patient Summary Reports.    I have reviewed the Nursing Notes.   I have reviewed the Medications.     Review of Systems  Anesthesia Hx:  No problems with previous Anesthesia  History of prior surgery of interest to airway management or planning: Previous  anesthesia: General  Breast reduction 2003 with general anesthesia.  Family Hx of Anesthesia complications: Family Anesthesia Complications are One aunt  because of low blood pressure:  Maternal aunt  on the operating room table while getting removal of Radio iodine implanted in the Uterus - this was in        Denies Personal Hx of Anesthesia complications.   Social:  Non-Smoker, No Alcohol Use    Hematology/Oncology:  Hematology Normal   Oncology Normal   Hematology Comments: Iron infusion 2017  Oncology: NET-bronchial carcinoid tumors. Oncology Comments: Carcinoid      EENT/Dental:  EENT/Dental Normal Glasses  Denies dysphagia   Cardiovascular:   Hypertension  Functional Capacity 3 METS, ADL's, light housework; walks with cane; teaches at  school; climb 1 FOS holding railing and using cane; SOB+, denies CP    Pulmonary:  Pulmonary Normal Asthma: had bronchitis 3 weeks ago. Bronchial carcinoid tumor dx -takes lanreotide-injection due 19;  Chronic dry cough; 5 pillow orthopnea   Renal/:  Renal/ Normal     Hepatic/GI:   GERD (takes ranitidine) Liver disease: states tumors in liver.    Musculoskeletal:  Musculoskeletal Normal Arthritis: knees.      OB/GYN/PEDS:  Adnexal mass left ovary   Neurological:   Denies CVA. Neuromuscular Disease,  Denies Seizures. Spastic paraplegia of BLE, hereditary Denies Pain    Endocrine:  Endocrine Normal    Dermatological:  Skin Normal Flushing occasionally   Psych:  Psychiatric Normal  anxiety  Phobia and Claustrophobia.         Physical Exam  General:  Obesity    Airway/Jaw/Neck:  Airway Findings: Mouth Opening: Normal Tongue: Normal  General Airway Assessment: Adult  Mallampati: II  Jaw/Neck Findings:     Neck ROM: Normal ROM  Neck Findings:  Girth Increased, Short Neck     Eyes/Ears/Nose:  Eyes/Ears/Nose Findings:    Dental:  Dental Findings: upper front caps, molar caps, upper partial dentures, lower partial dentures   Chest/Lungs:  Chest/Lungs  Findings: Clear to auscultation, Normal Respiratory Rate     Heart/Vascular:  Heart Findings: Rate: Normal  Rhythm: Regular Rhythm  Sounds: Normal  Heart Murmur  Vascular Findings:        Mental Status:  Mental Status Findings:  Cooperative, Alert and Oriented       Pt was seen in POC 1/22/19; please follow CARCINOID CRISIS PROPHYLAXIS; findings discussed with Dr Alfred. Medical optimization: please see EPIC notes for recommendations of pre-op medical consultant, Dr Hurley, for perioperative medical management.   /Yue Steel RN          Anesthesia Plan  Type of Anesthesia, risks & benefits discussed:  Anesthesia Type:  general  Patient's Preference: General  Intra-op Monitoring Plan: standard ASA monitors  Intra-op Monitoring Plan Comments:   Post Op Pain Control Plan: multimodal analgesia, IV/PO Opioids PRN and per primary service following discharge from PACU  Post Op Pain Control Plan Comments:   Induction:   IV  Beta Blocker:  Patient is not currently on a Beta-Blocker (No further documentation required).       Informed Consent: Patient understands risks and agrees with Anesthesia plan.  Questions answered. Anesthesia consent signed with patient.  ASA Score: 3     Day of Surgery Review of History & Physical:    H&P update referred to the surgeon.     Anesthesia Plan Notes: FAMILIAL SPASTIC PARAPLEGIA - rec avoid Sux and care with NDNMB - monitor TOF and use reversal         Ready For Surgery From Anesthesia Perspective.

## 2019-01-21 PROBLEM — I10 ESSENTIAL HYPERTENSION: Status: ACTIVE | Noted: 2019-01-21

## 2019-01-21 NOTE — PROGRESS NOTES
"Subjective:       Patient ID: Yvonne Holliday is a 63 y.o. female.    Chief Complaint: Adnexal Mass and Follow-up (sign surgery consents )    HPI   Patient comes in today to sign consents for open AUDREY/BSO for left adnexal mass.  Has bronchial carcinoid and is on octreotide LAR. Mass is negative on octreotide scan.    is 38. US showed a 9.5 x 8.9 x 7.5 cm echogenic left adnexal mass increased in size form 2016. EMS of 25 mm.         She has 5 pillow orthopnea.       Treatment history:    Sept 2005:  Diagnosed with a bronchial carcinoid after having a chronic cough for a number of years.     She had an abnormal x-ray which was monitored and had a biopsy in 9/2005 and was diagnosed with a well-differentiated neuroendocrine carcinoma.  Ki-67 demonstrated that the tumor had a low proliferative activity and was described as "a rare tumor cell is positive".  She had followed up at [facility] with Dr. Renan Vick who initially started her on short acting octreotide.  She was seen by Dr. Jimenez in July 2009 and converted to octreotide LAR.  She had a mild improvement in her cough but it also noted an improvement in symptoms of flushing.  She is followed in [facility] by Dr. Garibay.  In 7/2016 she was changed to Lanreotide.     Previously seen in this clinic by Salomón Jimenez and Atul but has not been seen since 4/2012.       Recently seen by Dr. Smith for follow up.  Because of enlarging left adnexal masses  was obtained. This is elevated at 46.  Patient has been referred for further management.     CT scans from [facility] have identified a left adnexal mass.  Previously measuring 8.1 x 6.3 x 6.7 cm in 2016.      August 2017 left adnexal mass measured 9.5 x 7 x 7.1 cm.     September 2018:  CT scan showed of the left adnexal mass to now measure 11.8 x 8.1 x 7.3 cm.  Nonvisualization of the right ovary.  Trace pelvic free fluid.  No sidewall adenopathy. Previously the " uterus was normal limits.      was obtained at outside laboratory and is elevated at 46 (0-38.1 units/ml)     Review of outside records reveals that this left adnexal mass was 1st noted in 2014.  According to the referral notes this was approximately 3 cm.  At time of follow-up in 2015 she reported that the measurement was 5.2 x 4.1 x 4.1 cm.  No report to view.  was normal at 11 (less than 35 units/ml).     Can only walk 20 feet before becoming SOB.   Sleeps on 5 pillows.         Past Medical History:   Diagnosis Date    Abnormal CT of liver 7/15/2016    Adnexal mass 7/15/2016    Asthma     Carcinoid syndrome 7/15/2016    Carcinoid tumor of lung 2005    Coughing     Drug therapy     sandostatin    HTN (hypertension)     Malignant carcinoid tumor of bronchus and lung 2016    Spastic paraplegia, hereditary     walks with cane    Thickened endometrium 2019    Wheezing      Past Surgical History:   Procedure Laterality Date    BREAST SURGERY      breast reduction     SECTION       Family History   Problem Relation Age of Onset    Cancer Mother         unknown primary    Cancer Maternal Aunt         gyn of some type    Cancer Paternal Grandfather         prostate    Cancer Maternal Aunt         bone cancer     Breast cancer Neg Hx     Colon cancer Neg Hx      Social History     Tobacco Use    Smoking status: Never Smoker   Substance Use Topics    Alcohol use: No    Drug use: No     Review of patient's allergies indicates:   Allergen Reactions    Epinephrine      Neuroendocrine Tumor patient      Pcn [penicillins]        Current Outpatient Medications:     albuterol (PROAIR HFA) 90 mcg/actuation inhaler, Inhale 2 puffs into the lungs every 6 (six) hours as needed for Wheezing., Disp: , Rfl:     allopurinol (ZYLOPRIM) 100 MG tablet, TK 1 T PO  D, Disp: , Rfl: 1    AMINO ACIDS/MV,FE,MIN (OCUVITE EXTRA ORAL), Take by mouth., Disp: , Rfl:      baclofen (LIORESAL) 10 MG tablet, 10 mg every evening. , Disp: , Rfl: 2    budesonide-formoterol 80-4.5 mcg (SYMBICORT) 80-4.5 mcg/actuation HFAA, Inhale 2 puffs into the lungs., Disp: , Rfl:     cetirizine (ZYRTEC) 10 MG tablet, Take 10 mg by mouth once daily., Disp: , Rfl:     clonazePAM (KLONOPIN) 0.5 MG tablet, Take 0.5 mg by mouth 2 (two) times daily as needed for Anxiety., Disp: , Rfl:     cyanocobalamin (VITAMIN B-12) 1000 MCG tablet, Take 100 mcg by mouth once daily., Disp: , Rfl:     dextromethorphan (DELSYM) 30 mg/5 mL liquid, Take 60 mg by mouth nightly as needed for Cough., Disp: , Rfl:     diclofenac sodium (VOLTAREN) 1 % Gel, Apply 2 g topically 4 (four) times daily., Disp: , Rfl:     docusate sodium (COLACE) 100 MG capsule, Take 100 mg by mouth every evening. , Disp: , Rfl:     FLUCELVAX QUAD 7846-4506 60 mcg (15 mcg x 4)/0.5 mL vaccine, , Disp: , Rfl: 0    gabapentin (NEURONTIN) 100 MG capsule, Take 100 mg by mouth 2 (two) times daily., Disp: , Rfl:     hydrochlorothiazide (HYDRODIURIL) 25 MG tablet, as needed. , Disp: , Rfl: 1    hydrocodone-homatropine 5-1.5 mg/5 ml (HYCODAN) 5-1.5 mg/5 mL Syrp, Take 5 mLs by mouth every 4 (four) hours as needed., Disp: , Rfl:     latanoprost 0.005 % ophthalmic solution, Place 1 drop into both eyes every evening., Disp: , Rfl:     loratadine (CLARITIN) 10 mg tablet, Take 10 mg by mouth once daily., Disp: , Rfl:     methocarbamol (ROBAXIN) 500 MG Tab, Take 500 mg by mouth 2 (two) times daily as needed., Disp: , Rfl:     multivitamin with minerals tablet, Take 1 tablet by mouth once daily., Disp: , Rfl:     POTASSIUM CITRATE ORAL, Take 99 mg by mouth every morning., Disp: , Rfl:     ranitidine (ZANTAC) 75 MG tablet, Take 150 mg by mouth nightly. , Disp: , Rfl:     SOMATULINE DEPOT 120 mg/0.5 mL Syrg, Inject 0.5 mLs (120 mg total) into the skin every 28 days., Disp: 1 Syringe, Rfl: 5    valsartan-hydrochlorothiazide (DIOVAN-HCT) 160-12.5 mg per  "tablet, 1 tablet once daily. , Disp: , Rfl: 1    valsartan-hydrochlorothiazide (DIOVAN-HCT) 160-25 mg per tablet, TK 1 T PO  D, Disp: , Rfl: 3    Review of Systems   Constitutional: Negative for chills, fatigue and fever.   Respiratory: Positive for cough and shortness of breath. Negative for wheezing.    Cardiovascular: Negative for chest pain, palpitations and leg swelling.   Gastrointestinal: Negative for abdominal pain, constipation, diarrhea, nausea and vomiting.   Genitourinary: Negative for difficulty urinating, dysuria, frequency, genital sores, hematuria, urgency, vaginal bleeding, vaginal discharge and vaginal pain.   Neurological: Negative for weakness.   Hematological: Negative for adenopathy. Does not bruise/bleed easily.   Psychiatric/Behavioral: The patient is not nervous/anxious.        Objective:   BP (!) 174/76   Pulse 85   Ht 5' 6" (1.676 m)   Wt 110.5 kg (243 lb 9.7 oz)   BMI 39.32 kg/m²      Physical Exam   Constitutional: She is oriented to person, place, and time. She appears well-developed and well-nourished.   HENT:   Head: Normocephalic and atraumatic.   Eyes: No scleral icterus.   Neck: No tracheal deviation present. No thyromegaly present.   Cardiovascular: Normal rate and regular rhythm.   Pulmonary/Chest: Effort normal and breath sounds normal.   Abdominal: Soft. She exhibits no distension and no mass. There is no tenderness. There is no rebound and no guarding. No hernia.   Genitourinary:   Genitourinary Comments: Not performed.    Musculoskeletal: She exhibits no edema or tenderness.   Lymphadenopathy:     She has no cervical adenopathy.   Neurological: She is alert and oriented to person, place, and time.   Skin: Skin is warm and dry.   Psychiatric: She has a normal mood and affect. Her behavior is normal. Judgment and thought content normal.       Assessment:       1. Adnexal mass    2. Carcinoid syndrome    3. Thickened endometrium        Plan:   Adnexal mass  Consents signed " for AUDREY/BSO  Clearance with Dr. Hurley  Preop orders placed.   Carcinoid syndrome    Thickened endometrium

## 2019-01-21 NOTE — H&P (VIEW-ONLY)
"Subjective:       Patient ID: Yvonne Holliday is a 63 y.o. female.    Chief Complaint: Adnexal Mass and Follow-up (sign surgery consents )    HPI   Patient comes in today to sign consents for open AUDREY/BSO for left adnexal mass.  Has bronchial carcinoid and is on octreotide LAR. Mass is negative on octreotide scan.    is 38. US showed a 9.5 x 8.9 x 7.5 cm echogenic left adnexal mass increased in size form 2016. EMS of 25 mm.         She has 5 pillow orthopnea.       Treatment history:    Sept 2005:  Diagnosed with a bronchial carcinoid after having a chronic cough for a number of years.     She had an abnormal x-ray which was monitored and had a biopsy in 9/2005 and was diagnosed with a well-differentiated neuroendocrine carcinoma.  Ki-67 demonstrated that the tumor had a low proliferative activity and was described as "a rare tumor cell is positive".  She had followed up at [facility] with Dr. Renan Vick who initially started her on short acting octreotide.  She was seen by Dr. Jimenez in July 2009 and converted to octreotide LAR.  She had a mild improvement in her cough but it also noted an improvement in symptoms of flushing.  She is followed in [facility] by Dr. Garibay.  In 7/2016 she was changed to Lanreotide.     Previously seen in this clinic by Salomón Jimenez and Atul but has not been seen since 4/2012.       Recently seen by Dr. Smith for follow up.  Because of enlarging left adnexal masses  was obtained. This is elevated at 46.  Patient has been referred for further management.     CT scans from [facility] have identified a left adnexal mass.  Previously measuring 8.1 x 6.3 x 6.7 cm in 2016.      August 2017 left adnexal mass measured 9.5 x 7 x 7.1 cm.     September 2018:  CT scan showed of the left adnexal mass to now measure 11.8 x 8.1 x 7.3 cm.  Nonvisualization of the right ovary.  Trace pelvic free fluid.  No sidewall adenopathy. Previously the " uterus was normal limits.      was obtained at outside laboratory and is elevated at 46 (0-38.1 units/ml)     Review of outside records reveals that this left adnexal mass was 1st noted in 2014.  According to the referral notes this was approximately 3 cm.  At time of follow-up in 2015 she reported that the measurement was 5.2 x 4.1 x 4.1 cm.  No report to view.  was normal at 11 (less than 35 units/ml).     Can only walk 20 feet before becoming SOB.   Sleeps on 5 pillows.         Past Medical History:   Diagnosis Date    Abnormal CT of liver 7/15/2016    Adnexal mass 7/15/2016    Asthma     Carcinoid syndrome 7/15/2016    Carcinoid tumor of lung 2005    Coughing     Drug therapy     sandostatin    HTN (hypertension)     Malignant carcinoid tumor of bronchus and lung 2016    Spastic paraplegia, hereditary     walks with cane    Thickened endometrium 2019    Wheezing      Past Surgical History:   Procedure Laterality Date    BREAST SURGERY      breast reduction     SECTION       Family History   Problem Relation Age of Onset    Cancer Mother         unknown primary    Cancer Maternal Aunt         gyn of some type    Cancer Paternal Grandfather         prostate    Cancer Maternal Aunt         bone cancer     Breast cancer Neg Hx     Colon cancer Neg Hx      Social History     Tobacco Use    Smoking status: Never Smoker   Substance Use Topics    Alcohol use: No    Drug use: No     Review of patient's allergies indicates:   Allergen Reactions    Epinephrine      Neuroendocrine Tumor patient      Pcn [penicillins]        Current Outpatient Medications:     albuterol (PROAIR HFA) 90 mcg/actuation inhaler, Inhale 2 puffs into the lungs every 6 (six) hours as needed for Wheezing., Disp: , Rfl:     allopurinol (ZYLOPRIM) 100 MG tablet, TK 1 T PO  D, Disp: , Rfl: 1    AMINO ACIDS/MV,FE,MIN (OCUVITE EXTRA ORAL), Take by mouth., Disp: , Rfl:      baclofen (LIORESAL) 10 MG tablet, 10 mg every evening. , Disp: , Rfl: 2    budesonide-formoterol 80-4.5 mcg (SYMBICORT) 80-4.5 mcg/actuation HFAA, Inhale 2 puffs into the lungs., Disp: , Rfl:     cetirizine (ZYRTEC) 10 MG tablet, Take 10 mg by mouth once daily., Disp: , Rfl:     clonazePAM (KLONOPIN) 0.5 MG tablet, Take 0.5 mg by mouth 2 (two) times daily as needed for Anxiety., Disp: , Rfl:     cyanocobalamin (VITAMIN B-12) 1000 MCG tablet, Take 100 mcg by mouth once daily., Disp: , Rfl:     dextromethorphan (DELSYM) 30 mg/5 mL liquid, Take 60 mg by mouth nightly as needed for Cough., Disp: , Rfl:     diclofenac sodium (VOLTAREN) 1 % Gel, Apply 2 g topically 4 (four) times daily., Disp: , Rfl:     docusate sodium (COLACE) 100 MG capsule, Take 100 mg by mouth every evening. , Disp: , Rfl:     FLUCELVAX QUAD [id number] 60 mcg (15 mcg x 4)/0.5 mL vaccine, , Disp: , Rfl: 0    gabapentin (NEURONTIN) 100 MG capsule, Take 100 mg by mouth 2 (two) times daily., Disp: , Rfl:     hydrochlorothiazide (HYDRODIURIL) 25 MG tablet, as needed. , Disp: , Rfl: 1    hydrocodone-homatropine 5-1.5 mg/5 ml (HYCODAN) 5-1.5 mg/5 mL Syrp, Take 5 mLs by mouth every 4 (four) hours as needed., Disp: , Rfl:     latanoprost 0.005 % ophthalmic solution, Place 1 drop into both eyes every evening., Disp: , Rfl:     loratadine (CLARITIN) 10 mg tablet, Take 10 mg by mouth once daily., Disp: , Rfl:     methocarbamol (ROBAXIN) 500 MG Tab, Take 500 mg by mouth 2 (two) times daily as needed., Disp: , Rfl:     multivitamin with minerals tablet, Take 1 tablet by mouth once daily., Disp: , Rfl:     POTASSIUM CITRATE ORAL, Take 99 mg by mouth every morning., Disp: , Rfl:     ranitidine (ZANTAC) 75 MG tablet, Take 150 mg by mouth nightly. , Disp: , Rfl:     SOMATULINE DEPOT 120 mg/0.5 mL Syrg, Inject 0.5 mLs (120 mg total) into the skin every 28 days., Disp: 1 Syringe, Rfl: 5    valsartan-hydrochlorothiazide (DIOVAN-HCT) 160-12.5 mg per  "tablet, 1 tablet once daily. , Disp: , Rfl: 1    valsartan-hydrochlorothiazide (DIOVAN-HCT) 160-25 mg per tablet, TK 1 T PO  D, Disp: , Rfl: 3    Review of Systems   Constitutional: Negative for chills, fatigue and fever.   Respiratory: Positive for cough and shortness of breath. Negative for wheezing.    Cardiovascular: Negative for chest pain, palpitations and leg swelling.   Gastrointestinal: Negative for abdominal pain, constipation, diarrhea, nausea and vomiting.   Genitourinary: Negative for difficulty urinating, dysuria, frequency, genital sores, hematuria, urgency, vaginal bleeding, vaginal discharge and vaginal pain.   Neurological: Negative for weakness.   Hematological: Negative for adenopathy. Does not bruise/bleed easily.   Psychiatric/Behavioral: The patient is not nervous/anxious.        Objective:   BP (!) 174/76   Pulse 85   Ht 5' 6" (1.676 m)   Wt 110.5 kg (243 lb 9.7 oz)   BMI 39.32 kg/m²      Physical Exam   Constitutional: She is oriented to person, place, and time. She appears well-developed and well-nourished.   HENT:   Head: Normocephalic and atraumatic.   Eyes: No scleral icterus.   Neck: No tracheal deviation present. No thyromegaly present.   Cardiovascular: Normal rate and regular rhythm.   Pulmonary/Chest: Effort normal and breath sounds normal.   Abdominal: Soft. She exhibits no distension and no mass. There is no tenderness. There is no rebound and no guarding. No hernia.   Genitourinary:   Genitourinary Comments: Not performed.    Musculoskeletal: She exhibits no edema or tenderness.   Lymphadenopathy:     She has no cervical adenopathy.   Neurological: She is alert and oriented to person, place, and time.   Skin: Skin is warm and dry.   Psychiatric: She has a normal mood and affect. Her behavior is normal. Judgment and thought content normal.       Assessment:       1. Adnexal mass    2. Carcinoid syndrome    3. Thickened endometrium        Plan:   Adnexal mass  Consents signed " for AUDREY/BSO  Clearance with Dr. Hurley  Preop orders placed.   Carcinoid syndrome    Thickened endometrium

## 2019-01-22 ENCOUNTER — OFFICE VISIT (OUTPATIENT)
Dept: GYNECOLOGIC ONCOLOGY | Facility: CLINIC | Age: 64
End: 2019-01-22
Payer: COMMERCIAL

## 2019-01-22 ENCOUNTER — HOSPITAL ENCOUNTER (OUTPATIENT)
Dept: PREADMISSION TESTING | Facility: HOSPITAL | Age: 64
Discharge: HOME | End: 2019-01-22
Attending: ANESTHESIOLOGY
Payer: COMMERCIAL

## 2019-01-22 ENCOUNTER — INITIAL CONSULT (OUTPATIENT)
Dept: INTERNAL MEDICINE | Facility: CLINIC | Age: 64
End: 2019-01-22
Payer: COMMERCIAL

## 2019-01-22 ENCOUNTER — HOSPITAL ENCOUNTER (OUTPATIENT)
Dept: CARDIOLOGY | Facility: CLINIC | Age: 64
Discharge: HOME | End: 2019-01-22
Payer: COMMERCIAL

## 2019-01-22 VITALS
DIASTOLIC BLOOD PRESSURE: 76 MMHG | SYSTOLIC BLOOD PRESSURE: 174 MMHG | BODY MASS INDEX: 39.15 KG/M2 | WEIGHT: 243.63 LBS | HEART RATE: 85 BPM | HEIGHT: 66 IN

## 2019-01-22 VITALS
OXYGEN SATURATION: 96 % | WEIGHT: 244 LBS | TEMPERATURE: 98 F | SYSTOLIC BLOOD PRESSURE: 157 MMHG | HEIGHT: 66 IN | DIASTOLIC BLOOD PRESSURE: 73 MMHG | RESPIRATION RATE: 16 BRPM | BODY MASS INDEX: 39.21 KG/M2 | HEART RATE: 86 BPM

## 2019-01-22 DIAGNOSIS — K21.9 GASTROESOPHAGEAL REFLUX DISEASE, ESOPHAGITIS PRESENCE NOT SPECIFIED: ICD-10-CM

## 2019-01-22 DIAGNOSIS — R60.9 EDEMA, UNSPECIFIED TYPE: ICD-10-CM

## 2019-01-22 DIAGNOSIS — E04.2 MULTINODULAR THYROID: ICD-10-CM

## 2019-01-22 DIAGNOSIS — R93.2 ABNORMAL CT OF LIVER: ICD-10-CM

## 2019-01-22 DIAGNOSIS — M10.9 GOUT, UNSPECIFIED CAUSE, UNSPECIFIED CHRONICITY, UNSPECIFIED SITE: ICD-10-CM

## 2019-01-22 DIAGNOSIS — G11.4 FAMILIAL SPASTIC PARAPARESIS: ICD-10-CM

## 2019-01-22 DIAGNOSIS — Z01.818 PREOP EXAMINATION: Primary | ICD-10-CM

## 2019-01-22 DIAGNOSIS — N94.89 ADNEXAL MASS: Primary | ICD-10-CM

## 2019-01-22 DIAGNOSIS — N94.89 ADNEXAL MASS: ICD-10-CM

## 2019-01-22 DIAGNOSIS — K59.00 CONSTIPATION, UNSPECIFIED CONSTIPATION TYPE: ICD-10-CM

## 2019-01-22 DIAGNOSIS — R93.89 THICKENED ENDOMETRIUM: ICD-10-CM

## 2019-01-22 DIAGNOSIS — Z01.818 PREOPERATIVE TESTING: ICD-10-CM

## 2019-01-22 DIAGNOSIS — I10 ESSENTIAL HYPERTENSION: ICD-10-CM

## 2019-01-22 DIAGNOSIS — E34.0 CARCINOID SYNDROME: ICD-10-CM

## 2019-01-22 DIAGNOSIS — E66.9 CLASS 2 OBESITY WITH BODY MASS INDEX (BMI) OF 39.0 TO 39.9 IN ADULT, UNSPECIFIED OBESITY TYPE, UNSPECIFIED WHETHER SERIOUS COMORBIDITY PRESENT: ICD-10-CM

## 2019-01-22 DIAGNOSIS — Z84.89: ICD-10-CM

## 2019-01-22 DIAGNOSIS — D3A.090 BRONCHIAL CARCINOID TUMORS: ICD-10-CM

## 2019-01-22 DIAGNOSIS — G47.30 SLEEP APNEA, UNSPECIFIED TYPE: ICD-10-CM

## 2019-01-22 DIAGNOSIS — J45.909 MODERATE ASTHMA WITHOUT COMPLICATION, UNSPECIFIED WHETHER PERSISTENT: ICD-10-CM

## 2019-01-22 PROCEDURE — 99214 OFFICE O/P EST MOD 30 MIN: CPT | Mod: S$GLB,,, | Performed by: OBSTETRICS & GYNECOLOGY

## 2019-01-22 PROCEDURE — 99244 PR OFFICE CONSULTATION,LEVEL IV: ICD-10-PCS | Mod: S$GLB,,, | Performed by: HOSPITALIST

## 2019-01-22 PROCEDURE — 3008F BODY MASS INDEX DOCD: CPT | Mod: S$GLB,,, | Performed by: OBSTETRICS & GYNECOLOGY

## 2019-01-22 PROCEDURE — 93010 ELECTROCARDIOGRAM REPORT: CPT | Mod: S$GLB,,, | Performed by: INTERNAL MEDICINE

## 2019-01-22 PROCEDURE — 93005 EKG 12-LEAD: ICD-10-PCS | Mod: S$GLB,,, | Performed by: ANESTHESIOLOGY

## 2019-01-22 PROCEDURE — 99999 PR PBB SHADOW E&M-EST. PATIENT-LVL IV: ICD-10-PCS | Mod: PBBFAC,,, | Performed by: OBSTETRICS & GYNECOLOGY

## 2019-01-22 PROCEDURE — 93005 ELECTROCARDIOGRAM TRACING: CPT | Mod: S$GLB,,, | Performed by: ANESTHESIOLOGY

## 2019-01-22 PROCEDURE — 99999 PR PBB SHADOW E&M-EST. PATIENT-LVL IV: CPT | Mod: PBBFAC,,, | Performed by: OBSTETRICS & GYNECOLOGY

## 2019-01-22 PROCEDURE — 99244 OFF/OP CNSLTJ NEW/EST MOD 40: CPT | Mod: S$GLB,,, | Performed by: HOSPITALIST

## 2019-01-22 PROCEDURE — 99999 PR PBB SHADOW E&M-EST. PATIENT-LVL II: CPT | Mod: PBBFAC,,, | Performed by: HOSPITALIST

## 2019-01-22 PROCEDURE — 99214 PR OFFICE/OUTPT VISIT, EST, LEVL IV, 30-39 MIN: ICD-10-PCS | Mod: S$GLB,,, | Performed by: OBSTETRICS & GYNECOLOGY

## 2019-01-22 PROCEDURE — 93010 EKG 12-LEAD: ICD-10-PCS | Mod: S$GLB,,, | Performed by: INTERNAL MEDICINE

## 2019-01-22 PROCEDURE — 3008F PR BODY MASS INDEX (BMI) DOCUMENTED: ICD-10-PCS | Mod: S$GLB,,, | Performed by: OBSTETRICS & GYNECOLOGY

## 2019-01-22 PROCEDURE — 99999 PR PBB SHADOW E&M-EST. PATIENT-LVL II: ICD-10-PCS | Mod: PBBFAC,,, | Performed by: HOSPITALIST

## 2019-01-22 RX ORDER — LIDOCAINE HYDROCHLORIDE 10 MG/ML
1 INJECTION, SOLUTION EPIDURAL; INFILTRATION; INTRACAUDAL; PERINEURAL ONCE
Status: CANCELLED | OUTPATIENT
Start: 2019-01-22 | End: 2019-01-22

## 2019-01-22 RX ORDER — SODIUM CHLORIDE 0.9 % (FLUSH) 0.9 %
5 SYRINGE (ML) INJECTION
Status: CANCELLED | OUTPATIENT
Start: 2019-01-22

## 2019-01-22 RX ORDER — MUPIROCIN 20 MG/G
OINTMENT TOPICAL
Status: CANCELLED | OUTPATIENT
Start: 2019-01-22

## 2019-01-22 NOTE — ASSESSMENT & PLAN NOTE
Sept 2005:  Diagnosed with a bronchial carcinoid after having a chronic cough for a number of years.     On Treatment with Lanreotide - continuing patty op   Stable per her   Under follow up   Has occasional flushing, diarrhea- no recent trouble from it  I suggest that the perioperative team be aware of this so that appropriate preventive care can be taken

## 2019-01-22 NOTE — ASSESSMENT & PLAN NOTE
Dec2018     Liver: Normal in size with a 4.6 cm right hepatic simple cyst and additional scattered subcentimeter areas of hypoattenuation within the left hepatic lobe too small to fully characterize but likely representing additional cysts.  Portal vein is patent.

## 2019-01-22 NOTE — ASSESSMENT & PLAN NOTE
Edema- I suggested avoidance of added salt,avoidance of NSAID's and suggested Limb elevation and mari hose use

## 2019-01-22 NOTE — ASSESSMENT & PLAN NOTE
Valsartan- HCTZ  Recent BP readings in the record-Fluctuating   Hypertension-  Blood pressure is acceptable .  I suggest holding Valsartan- HCTZ- on the morning of the surgery and can continue that  post operatively under blood pressure, electrolyte and renal function monitoring as long as they are acceptable.I suggest addressing pain control as uncontrolled pain can increased blood pressure

## 2019-01-22 NOTE — ASSESSMENT & PLAN NOTE
Family history of averse events with an general anesthesia.   One aunt  because of low blood pressure  Maternal aunty  on the operating room table , while getting removal of Radio iodine implanted in the Uterus - this was in    Had Anesthesiology evaluation   Had C section in  ( Spinal ) , breast reduction on    Took longer time to recover from anaesthesia ( was sleepy )  No other family members with Anesthesia problems

## 2019-01-22 NOTE — ASSESSMENT & PLAN NOTE
No recent flare up   Not on Allopurinol   Knows that it is for preventive care   Gout -I suggest  to keep the patient adequately hydrated.Please note that surgical stress ,dehydration can precipitate acute gout

## 2019-01-22 NOTE — OUTPATIENT SUBJECTIVE & OBJECTIVE
Outpatient Subjective & Objective     Chief complaint-Preoperative evaluation, Perioperative Medical management, complication reduction plan     Active cardiac conditions- none    Revised cardiac risk index predictors- high-risk type of surgery    Functional capacity -Examples of physical activity  can take a flight of stairs holding on to the railing----- She can undertake all the above activities without  chest pain,chest tightness,  ,dizziness,lightheadedness making her exercise tolerance more,   than 4 Mets.   Winded on exertion, not new ,stable over time     Review of Systems   Constitutional: Negative for chills and fever.   HENT:        Sleep apnea   Eyes:        No new visual changes  Has cataracts  Has Glucoma    Respiratory:          Usually has dry cough   Cough with clear phlegm - getting better  Occasional coughing blood on heavy coughing - suggested follow up     Cardiovascular:        As noted   Gastrointestinal:        No overt GI/ blood losses  Bowel movements- Regular    Endocrine:        Prednisone use > 20 mg daily for 3 weeks- None    Genitourinary: Negative for dysuria.        No urinary hesitancy    Musculoskeletal:        As above      Skin: Negative for rash.   Neurological: Negative for syncope.        No unilateral weakness   Hematological:        Current use of Anticoagulants  Current use of Antiplatelet agents  None    Psychiatric/Behavioral:          Anxiety about up coming surgery   No SI/HI     No vascular stenting           No anesthesia, bleeding, cardiac problems , PONVwith previous surgeries/procedures.  Medications and Allergies reviewed in epic.   FH- No bleeding in family   Lives with 2 puppies   Son is a nurse   Physical Exam  There were no vitals taken for this visit.      Physical Exam  Constitutional- Vitals - There is no height or weight on file to calculate BMI., There were no vitals filed for this visit.  General appearance-Conscious,Coherent  Eyes- No conjunctival  icterus,pupils  round  and reactive to light   ENT-Oral cavity- moist ,  upper partial denture  and lower partial denture  , Hearing grossly normal   Neck- No thyromegaly ,Trachea -central, No jugular venous distension,   No Carotid Bruit   Cardiovascular -Heart Sounds- Normal  and  no murmur   , No gallop rhythm   Respiratory - Normal Respiratory Effort, Normal breath sounds,  no wheeze  and  no forced expiratory wheeze    Peripheral pitting pedal edema-- mild, no calf pain   Gastrointestinal -Soft abdomen, No palpable masses, Non Tender,Liver,Spleen not palpable. No-- free fluid and shifting dullness  Musculoskeletal- No finger Clubbing. Strength grossly normal   Lymphatic-No Palpable cervical, axillary,Inguinal lymphadenopathy   Psychiatric - normal effect,Orientation  Rt Dorsalis pedis pulses-palpable    Lt Dorsalis pedis pulses- palpable   Rt Posterior tibial pulses -palpable   Left posterior tibial pulses -palpable   Miscellaneous -  no renal bruit  Investigations  Lab and Imaging have been reviewed in epic.    Review of Medicine tests    EKG- I had independently reviewed the EKG from--today   No acute changes     July 2016     1 - Normal left ventricular systolic function (EF 55-60%).     2 - Normal left ventricular diastolic function.     3 - Normal right ventricular systolic function .     Review of clinical lab tests:  Lab Results   Component Value Date    CREATININE 0.8 12/11/2018    HGB 13.9 01/22/2019     01/22/2019             Review of old records- Was done and information gathered regards to events leading to surgery and health conditions of significance in the perioperative period.    Outpatient Subjective & Objective

## 2019-01-22 NOTE — ASSESSMENT & PLAN NOTE
Diagnosed as a child   Re occured with Carcinoid   Presents with dry cough   2 weeks ago had bronchitis , that is resolving without antibiotic   CT chest Dec 2018   Well expanded bilaterally without acute consolidation, pleural effusion, or pneumothorax.    Redemonstration of numerous scattered rounded lesions of varying sizes in a pattern suggestive for metastatic disease.  Overall, lesions appear grossly stable dating back to most recent in-house study dated 07/13/2016.     SOB on exertion   Limits Exertion, as he coughs   Does house work   Walks her puppies within her hard  Vacuums  Has pot plants   Does laundry   Last took a stairs at a [employer] restaurant ( 5-6 steep steps ) a few months ago - twice     No exertional chest pain, Chest tightness , No dizziness , lightheadedness    SOB/ Cough attributed to carcinoid     Uses Symbicort that helps  Rinses mouth after use ( Thrush risk discussed )     Asthma is controlled ( Had a flare up with Bronchitis , 2 weeks ago )  I suggest consideration of inhaled bronchodilator use if the patient has perioperative bronchospasm     SOB seems to be from a pulmonary cause( Carcinoid )  rather than cardiac

## 2019-01-22 NOTE — HPI
History of present illness- I had the pleasure of meeting this pleasant 63 y.o. lady in the pre op clinic prior to her elective Gynecological surgery. The patient is new to me , accompanied by Brenda.  Goes by Negar    I have obtained the history by speaking to the patient and by reviewing the electronic health records.    Events leading up to surgery / History of presenting illness -    Adnexal mass  Known to have this for some time   Noted growth of the mass in the past year , while monitoring the carcinoid   No pain from this .No aggravating factors. No relieving factors     Relevant health conditions of significance for the perioperative period/ History of presenting illness -    Health conditions of significance for the perioperative period , HTN, Carcinoid     Patient Active Problem List    Diagnosis Date Noted    Family history of adverse effect to anesthesia 01/22/2019    Thickened endometrium 01/22/2019    Moderate asthma without complication 01/22/2019    Class 2 obesity with body mass index (BMI) of 39.0 to 39.9 in adult 01/22/2019    Sleep apnea 01/22/2019    Constipation 01/22/2019    Gout 01/22/2019    Acid reflux 01/22/2019    Familial spastic paraparesis 01/22/2019    Essential hypertension 01/21/2019    Bronchial carcinoid tumors 07/15/2016    Carcinoid syndrome 07/15/2016    Adnexal mass 07/15/2016     Left        Abnormal CT of liver 07/15/2016     Not known to have heart disease , Diabetes Mellitus

## 2019-01-22 NOTE — ASSESSMENT & PLAN NOTE
Sleep apnea .Uses CPAP .Suggested bringing for hospital use . Informed the risk of worsening sleep apnea in the perioperative period and suggest using CPAP/BIPAP use any time in 24 hrs ( day or night )for planned sleep     I suggest  caution with usage of medication that can cause respiratory suppression in the perioperative period    Avoidance of  supine sleep, weight gain , alcoholic beverages , care with , sedative , CNS depressant use indicated  since all of these can worsen OSCAR

## 2019-01-22 NOTE — DISCHARGE INSTRUCTIONS
Your surgery has been scheduled for:__________________________________________    You should report to:  ____[facility], located on the [place] side of the first floor of the           [facility] ([phone])  ____[facility], located on the [facility] side of the            Second floor of the [facility] ([phone])  ____3rd Floor [facility] located on the [facility] side of the [facility] [phone]  Please Note   - Tell your doctor if you take Aspirin, products containing Aspirin, herbal medications  or blood thinners, such as Coumadin, Ticlid, or Plavix.  (Consult your provider regarding holding or stopping before surgery).  - Arrange for someone to drive you home following surgery.  You will not be allowed to leave the surgical facility alone or drive yourself home following sedation and anesthesia.  Before Surgery  - Stop taking all herbal medications 14days prior to surgery  - No Motrin/Advil (Ibuprofen) 7 days before surgery  - No Aleve (Naproxen) 7 days before surgery  - Stop Taking Asprin, products containing Asprin _____days before surgery  - Stop taking blood thinners_______days before surgery  - No Goody's/BC  Powder 7 days before surgery  - Refrain from drinking alcoholic beverages for 24hours before and after surgery  - Stop or limit smoking _________days before surgery  - You may take Tylenol for pain    Night before Surgery  NOTHING TO EAT OR DRINK AFTER MIDNIGHT OR FOLLOW SURGEON'S INSTRUCTIONS  - Take a shower or bath (shower is recommended).  Bathe with Hibiclens soap or an antibacterial soap from the neck down.  If not supplied by your surgeon, hibiclens soap will need to be purchased over the counter in pharmacy.  Rinse soap off thoroughly.  - Shampoo your hair with your regular shampoo  The Day of Surgery  ·  If you are told to take medication on the morning of surgery, it may be taken with  a sip of water.   - Take another bath or shower with hibiclens or any antibacterial soap, to reduce the chance of infection.  - Take heart and blood pressure medications with a small sip of water, as advised by the perioperative team.  - Do not take fluid pills  - You may brush your teeth and rinse your mouth, but do not swall any additional water.   - Do not apply perfumes, powder, body lotions or deodorant on the day of surgery.  - Nail polish should be removed.  - Do not wear makeup or moisturizer  - Wear comfortable clothes, such as a button front shirt and loose fitting pants.  - Leave all jewelry, including body piercings, and valuables at home.    - Bring any devices you will neeed after surgery such as crutches or canes.  - If you have sleep apnea, please bring your CPAP machine  In the event that your physical condition changes including the onset of a cold or respiratory illness, or if you have to delay or cancel your surgery, please notify your surgeon.      Anesthesia: General Anesthesia  Youre due to have surgery. During surgery, youll be given medication called anesthesia. (It is also called anesthetic.) This will keep you comfortable and pain-free. Your anesthesia provider will use general anesthesia. This sheet tells you more about it.  What is general anesthesia?     You are watched continuously during your procedure by the anesthesia provider   General anesthesia puts you into a state like deep sleep. It goes into the bloodstream (IV anesthetics), into the lungs (gas anesthetics), or both. You feel nothing during the procedure. You will not remember it. During the procedure, the anesthesia provider monitors you continuously. He or she checks your heart rate and rhythm, blood pressure, breathing, and blood oxygen.  · IV Anesthetics. IV anesthetics are given through an IV line in your arm. Theyre often given first. This is so you are asleep before a gas anesthetic is started. Some kinds of IV  anesthetics relieve pain. Others relax you. Your doctor will decide which kind is best in your case.  · Gas Anesthetics. Gas anesthetics are breathed into the lungs. They are often used to keep you asleep. They can be given through a facemask or a tube placed in your larynx or trachea (breathing tube).  ? If you have a facemask, your anesthesia provider will most likely place it over your nose and mouth while youre still awake. Youll breathe oxygen through the mask as your IV anesthetic is started. Gas anesthetic may be added through the mask.  ? If you have a tube in the larynx or trachea, it will be inserted into your throat after youre asleep.  Anesthesia tools and medications  You will likely have:  · IV anesthetics. These are put into an IV line into your bloodstream.  · Gas anesthetics. You breathe these anesthetics into your lungs, where they pass into your bloodstream.  · Pulse oximeter. This is a small clip that is attached to the end of your finger. This measures your blood oxygen level.  · Electrocardiography leads (electrodes). These are small sticky pads that are placed on your chest. They record your heart rate and rhythm.  · Blood pressure cuff. This reads your blood pressure.  Risks and possible complications  General anesthesia has some risks. These include:  · Breathing problems  · Nausea and vomiting  · Sore throat or hoarseness (usually temporary)  · Allergic reaction to the anesthetic  · Irregular heartbeat (rare)  · Cardiac arrest (rare)   Anesthesia safety  · Follow all instructions you are given for how long not to eat or drink before your procedure.  · Be sure your doctor knows what medications and drugs you take. This includes over-the-counter medications, herbs, supplements, alcohol or other drugs. You will be asked when those were last taken.  · Have an adult family member or friend drive you home after the procedure.  · For the first 24 hours after your surgery:  ? Do not drive or use  heavy equipment.  ? Have a trusted family member or spouse make important decisions or sign documents.  ? Avoid alcohol.  ? Have a responsible adult stay with you. He or she can watch for problems and help keep you safe.  Date Last Reviewed: 10/16/2014  © 2298-6178 [employer]. [address] [place] [id number]. All rights reserved. This information is not intended as a substitute for professional medical care. Always follow your healthcare professional's instructions.

## 2019-01-22 NOTE — ASSESSMENT & PLAN NOTE
Controlled   GERD-  I suggest continuation of the Ranitidine in the perioperative period . I suggest aspiration precautions

## 2019-01-22 NOTE — ASSESSMENT & PLAN NOTE
Diagnosed in late 50's   Has muscular spasms , both thighs   Using Gabapentin , Robaxin, Baclofen   No recent problem   To her knowledge no respiratory involvement   3 of her cousins has similar problem    I suggest that the perioperative team be aware of this so that appropriate preventive care can be taken

## 2019-01-22 NOTE — ASSESSMENT & PLAN NOTE
Not known to diabetes   Encouraged weight loss    Lost 33 pounds over 6 months with [employer]

## 2019-01-22 NOTE — PROGRESS NOTES
[facility] - Pre Op Consult  Progress Note    Patient Name: Yvonne Holliday  MRN: [id number]  Date of Evaluation- 01/24/2019  PCP- Primary Doctor No    Future cases for Negar Holliday [[id number]]     Case ID Status Date Time Terry Procedure Provider Location    [id number] [facility] 2/4/2019  7:00  HYSTERECTOMY, TOTAL, ABDOMINAL Regan Harrison MD [[id number]] [facility] OR 2ND FLR          HPI:  History of present illness- I had the pleasure of meeting this pleasant 63 y.o. lady in the pre op clinic prior to her elective Gynecological surgery. The patient is new to me , accompanied by Brenda.  Goes by Negar    I have obtained the history by speaking to the patient and by reviewing the electronic health records.    Events leading up to surgery / History of presenting illness -    Adnexal mass  Known to have this for some time   Noted growth of the mass in the past year , while monitoring the carcinoid   No pain from this .No aggravating factors. No relieving factors     Relevant health conditions of significance for the perioperative period/ History of presenting illness -    Health conditions of significance for the perioperative period , HTN, Carcinoid     Patient Active Problem List    Diagnosis Date Noted    Family history of adverse effect to anesthesia 01/22/2019    Thickened endometrium 01/22/2019    Moderate asthma without complication 01/22/2019    Class 2 obesity with body mass index (BMI) of 39.0 to 39.9 in adult 01/22/2019    Sleep apnea 01/22/2019    Constipation 01/22/2019    Gout 01/22/2019    Acid reflux 01/22/2019    Familial spastic paraparesis 01/22/2019    Essential hypertension 01/21/2019    Bronchial carcinoid tumors 07/15/2016    Carcinoid syndrome 07/15/2016    Adnexal mass 07/15/2016     Left        Abnormal CT of liver 07/15/2016     Not known to have heart disease , Diabetes Mellitus       Subjective/ Objective:          Chief complaint-Preoperative evaluation, Perioperative Medical management,  complication reduction plan     Active cardiac conditions- none    Revised cardiac risk index predictors- high-risk type of surgery    Functional capacity -Examples of physical activity  can take a flight of stairs holding on to the railing----- She can undertake all the above activities without  chest pain,chest tightness,  ,dizziness,lightheadedness making her exercise tolerance more,   than 4 Mets.   Winded on exertion, not new ,stable over time     Review of Systems   Constitutional: Negative for chills and fever.   HENT:        Sleep apnea   Eyes:        No new visual changes  Has cataracts  Has Glucoma    Respiratory:          Usually has dry cough   Cough with clear phlegm - getting better  Occasional coughing blood on heavy coughing - suggested follow up     Cardiovascular:        As noted   Gastrointestinal:        No overt GI/ blood losses  Bowel movements- Regular    Endocrine:        Prednisone use > 20 mg daily for 3 weeks- None    Genitourinary: Negative for dysuria.        No urinary hesitancy    Musculoskeletal:        As above      Skin: Negative for rash.   Neurological: Negative for syncope.        No unilateral weakness   Hematological:        Current use of Anticoagulants  Current use of Antiplatelet agents  None    Psychiatric/Behavioral:          Anxiety about up coming surgery   No SI/HI     No vascular stenting           No anesthesia, bleeding, cardiac problems , PONVwith previous surgeries/procedures.  Medications and Allergies reviewed in epic.   FH- No bleeding in family   Lives with 2 puppies   Son is a nurse   Physical Exam  There were no vitals taken for this visit.      Physical Exam  Constitutional- Vitals - There is no height or weight on file to calculate BMI., There were no vitals filed for this visit.  General appearance-Conscious,Coherent  Eyes- No conjunctival icterus,pupils  round  and reactive to light   ENT-Oral cavity- moist ,  upper partial denture  and lower partial  denture  , Hearing grossly normal   Neck- No thyromegaly ,Trachea -central, No jugular venous distension,   No Carotid Bruit   Cardiovascular -Heart Sounds- Normal  and  no murmur   , No gallop rhythm   Respiratory - Normal Respiratory Effort, Normal breath sounds,  no wheeze  and  no forced expiratory wheeze    Peripheral pitting pedal edema-- mild, no calf pain   Gastrointestinal -Soft abdomen, No palpable masses, Non Tender,Liver,Spleen not palpable. No-- free fluid and shifting dullness  Musculoskeletal- No finger Clubbing. Strength grossly normal   Lymphatic-No Palpable cervical, axillary,Inguinal lymphadenopathy   Psychiatric - normal effect,Orientation  Rt Dorsalis pedis pulses-palpable    Lt Dorsalis pedis pulses- palpable   Rt Posterior tibial pulses -palpable   Left posterior tibial pulses -palpable   Miscellaneous -  no renal bruit  Investigations  Lab and Imaging have been reviewed in epic.    Review of Medicine tests    EKG- I had independently reviewed the EKG from--today   No acute changes     July 2016     1 - Normal left ventricular systolic function (EF 55-60%).     2 - Normal left ventricular diastolic function.     3 - Normal right ventricular systolic function .     Review of clinical lab tests:  Lab Results   Component Value Date    CREATININE 0.8 12/11/2018    HGB 13.9 01/22/2019     01/22/2019             Review of old records- Was done and information gathered regards to events leading to surgery and health conditions of significance in the perioperative period.        Preoperative cardiac risk assessment-  The patient does not have any active cardiac conditions . Revised cardiac risk index predictors- 1---.Functional capacity is more than 4 Mets. She will be undergoing a Gynecological procedure that carries a high risk     The estimated risk of the rate of adverse cardiac outcomes  0.9%    No further cardiac work up is indicated prior to proceeding with the surgery     Orders Placed  This Encounter    Ambulatory consult to Pulmonology       American Society of Anesthesiologists Physical status classification ( ASA ) class: 3     Postoperative pulmonary complication risk assessment:      ARISCAT ( Canet) risk index- risk class -  Low, if duration of surgery is under 3 hours, intermediate, if duration of surgery is over 3 hours        Assessment/Plan:     Essential hypertension  Valsartan- HCTZ  Recent BP readings in the record-Fluctuating   Hypertension-  Blood pressure is acceptable .  I suggest holding Valsartan- HCTZ- on the morning of the surgery and can continue that  post operatively under blood pressure, electrolyte and renal function monitoring as long as they are acceptable.I suggest addressing pain control as uncontrolled pain can increased blood pressure       Bronchial carcinoid tumors    2005:  Diagnosed with a bronchial carcinoid after having a chronic cough for a number of years.     On Treatment with Lanreotide - continuing patty op   Stable per her   Under follow up   Has occasional flushing, diarrhea- no recent trouble from it  I suggest that the perioperative team be aware of this so that appropriate preventive care can be taken          Adnexal mass  Large left ovarian tumor     Family history of adverse effect to anesthesia  Family history of averse events with an general anesthesia.   One aunt  because of low blood pressure  Maternal aunty  on the operating room table , while getting removal of Radio iodine implanted in the Uterus - this was in    Had Anesthesiology evaluation   Had C section in  ( Spinal ) , breast reduction on    Took longer time to recover from anaesthesia ( was sleepy )  No other family members with Anesthesia problems       Abnormal CT of liver       Liver: Normal in size with a 4.6 cm right hepatic simple cyst and additional scattered subcentimeter areas of hypoattenuation within the left hepatic lobe too small to fully  characterize but likely representing additional cysts.  Portal vein is patent.    Moderate asthma without complication  Diagnosed as a child   Re occured with Carcinoid   Presents with dry cough   2 weeks ago had bronchitis , that is resolving without antibiotic   CT chest Dec 2018   Well expanded bilaterally without acute consolidation, pleural effusion, or pneumothorax.    Redemonstration of numerous scattered rounded lesions of varying sizes in a pattern suggestive for metastatic disease.  Overall, lesions appear grossly stable dating back to most recent in-house study dated 07/13/2016.     SOB on exertion   Limits Exertion, as he coughs   Does house work   Walks her puppies within her hard  Vacuums  Has pot plants   Does laundry   Last took a stairs at a [employer] restaurant ( 5-6 steep steps ) a few months ago - twice     No exertional chest pain, Chest tightness , No dizziness , lightheadedness    SOB/ Cough attributed to carcinoid     Uses Symbicort that helps  Rinses mouth after use ( Thrush risk discussed )     Asthma is controlled ( Had a flare up with Bronchitis , 2 weeks ago )  I suggest consideration of inhaled bronchodilator use if the patient has perioperative bronchospasm     SOB seems to be from a pulmonary cause( Carcinoid )  rather than cardiac           Class 2 obesity with body mass index (BMI) of 39.0 to 39.9 in adult  Not known to diabetes   Encouraged weight loss    Lost 33 pounds over 6 months with [employer]     Sleep apnea  Sleep apnea .Uses CPAP .Suggested bringing for hospital use . Informed the risk of worsening sleep apnea in the perioperative period and suggest using CPAP/BIPAP use any time in 24 hrs ( day or night )for planned sleep     I suggest  caution with usage of medication that can cause respiratory suppression in the perioperative period    Avoidance of  supine sleep, weight gain , alcoholic beverages , care with , sedative , CNS depressant use indicated  since all of  these can worsen OSCAR             Constipation  Attributes to Carcinoid treatment        Gout  No recent flare up   Not on Allopurinol   Knows that it is for preventive care   Gout -I suggest  to keep the patient adequately hydrated.Please note that surgical stress ,dehydration can precipitate acute gout         Acid reflux  Controlled   GERD-  I suggest continuation of the Ranitidine in the perioperative period . I suggest aspiration precautions    Familial spastic paraparesis  Diagnosed in late 50's   Has muscular spasms , both thighs   Using Gabapentin , Robaxin, Baclofen   No recent problem   To her knowledge no respiratory involvement   3 of her cousins has similar problem    I suggest that the perioperative team be aware of this so that appropriate preventive care can be taken     Multinodular thyroid  Was evaluated   She choose not to have biopsy   'No dysphagia    Edema  Edema- I suggested avoidance of added salt,avoidance of NSAID's and suggested Limb elevation and mari hose use        Preventive perioperative care    Thromboembolic prophylaxis:  Her risk factors for thrombosis include obesity, surgical procedure and age.I suggest  thromboembolic prophylaxis ( mechanical/pharmacological, weighing the risk benefits of pharmacological agent use considering patty procedural bleeding )  during the perioperative period.I suggested being active in the post operative period.      Postoperative pulmonary complication prophylaxis-Risk factors for post operative pulmonary complications include sleep apnea, Asthma  ASA class >2 and proximity of the surgical site to the lungs- I suggest incentive spirometry use, early ambulation, end tidal carbon dioxide monitoring and pain control so as to avoid diaphragmatic splinting, oral care , head end of bed elevation        Renal complication prophylaxis-Risk factors for renal complications include hypertension . I suggest keeping her well hydrated .I suggested drinking 2 litre's  of water a day      Surgical site Infection Prophylaxis-I  suggest appropriate antibiotic for Prophylaxis against Surgical site infections      In view of gynecological procedure the patient  is at risk of postoperative urinary retention.  I suggest avoidance / minimizing the of  Benzodiazepines,Anticholinergic medication,antihistamines ( Benadryl) , if possible in the perioperative period. I suggest using the minimum possible use of opioids for the minimum period of time in the perioperative period. Benadryl avoidance suggested      This visit was focused on Preoperative evaluation, Perioperative Medical management, complication reduction plans. I suggest that the patient follows up with primary care or relevant sub specialists for ongoing health care.    I appreciate the opportunity to be involved in this patients care. Please feel free to contact me if there were any questions about this consultation.    Patient is optimized     Patient  was instructed to call and update me about any changes to health,  medication, office visits ,testing out side of the patty operative care center , hospitalizations between now and surgery     Rochelle Hurley MD  Perioperative Medicine  [facility]   Pager [phone]  Edema- I suggested avoidance of added salt,avoidance of NSAID's and suggested Limb elevation and mari hose use  ---1/22- 17 22    EKG report     Normal sinus rhythm  Left axis deviation  Abnormal ECG  No previous ECGs available    Vitals from 1/22-     /73  Pulse 86   Temp 98.4   Sat 96 %     Messaged Luis Kate   Respiratory symptoms likely from Carcinoid   ---  1/24- 19 46     Correspondence from Christy Melendez  Plan for pulmonary Evaluation - Requested   ---  1/24- 18 19     Message from the office     She asked if she had to have this appt.  She has a transportation issue for one and it is over a 2 hr drive for her   Called and spoke to her   She had 2 different pulmonology  evaluations and they could not help the cough   She is of the impression that her cough , SOB is from Carcinoid   Since she already  had pulmonology evaluations in the past , she is not so inclined to have another one   Recovering from bronchitis   ----  2/1- 8 19     Called to follow up , spoke to her, to address any concerns with the up coming surgery or any questions on Medication instructions -  Doing fine, ready for surgery , breathing , cough back to base line   No changes to Medication, Health-

## 2019-02-01 ENCOUNTER — TELEPHONE (OUTPATIENT)
Dept: GYNECOLOGIC ONCOLOGY | Facility: CLINIC | Age: 64
End: 2019-02-01

## 2019-02-01 NOTE — TELEPHONE ENCOUNTER
Called patient to remind her of surgery on 2/4 with arrival time of 5 am. Patient voiced understanding. FRANNIE

## 2019-02-04 ENCOUNTER — ANESTHESIA (OUTPATIENT)
Dept: SURGERY | Facility: HOSPITAL | Age: 64
DRG: 742 | End: 2019-02-04
Payer: COMMERCIAL

## 2019-02-04 ENCOUNTER — HOSPITAL ENCOUNTER (INPATIENT)
Facility: HOSPITAL | Age: 64
LOS: 2 days | Discharge: HOME | DRG: 742 | End: 2019-02-06
Attending: OBSTETRICS & GYNECOLOGY | Admitting: OBSTETRICS & GYNECOLOGY
Payer: COMMERCIAL

## 2019-02-04 DIAGNOSIS — Z90.722 S/P BSO (BILATERAL SALPINGO-OOPHORECTOMY): ICD-10-CM

## 2019-02-04 DIAGNOSIS — N94.89 ADNEXAL MASS: ICD-10-CM

## 2019-02-04 DIAGNOSIS — Z90.722 S/P BSO (BILATERAL SALPINGO-OOPHORECTOMY): Primary | ICD-10-CM

## 2019-02-04 PROCEDURE — 63600175 PHARM REV CODE 636 W HCPCS: Performed by: OBSTETRICS & GYNECOLOGY

## 2019-02-04 PROCEDURE — 94660 CPAP INITIATION&MGMT: CPT

## 2019-02-04 PROCEDURE — 25000003 PHARM REV CODE 250: Performed by: OBSTETRICS & GYNECOLOGY

## 2019-02-04 PROCEDURE — 37000008 HC ANESTHESIA 1ST 15 MINUTES: Performed by: OBSTETRICS & GYNECOLOGY

## 2019-02-04 PROCEDURE — 88307 TISSUE EXAM BY PATHOLOGIST: CPT | Performed by: PATHOLOGY

## 2019-02-04 PROCEDURE — 27201037 HC PRESSURE MONITORING SET UP

## 2019-02-04 PROCEDURE — 63600175 PHARM REV CODE 636 W HCPCS: Performed by: NURSE ANESTHETIST, CERTIFIED REGISTERED

## 2019-02-04 PROCEDURE — 58720 REMOVAL OF OVARY/TUBE(S): CPT | Mod: ,,, | Performed by: OBSTETRICS & GYNECOLOGY

## 2019-02-04 PROCEDURE — 27000190 HC CPAP FULL FACE MASK W/VALVE

## 2019-02-04 PROCEDURE — 71000033 HC RECOVERY, INTIAL HOUR: Performed by: OBSTETRICS & GYNECOLOGY

## 2019-02-04 PROCEDURE — 88331 PATH CONSLTJ SURG 1 BLK 1SPC: CPT | Mod: 26,,, | Performed by: PATHOLOGY

## 2019-02-04 PROCEDURE — 94799 UNLISTED PULMONARY SVC/PX: CPT

## 2019-02-04 PROCEDURE — 25000242 PHARM REV CODE 250 ALT 637 W/ HCPCS: Performed by: NURSE ANESTHETIST, CERTIFIED REGISTERED

## 2019-02-04 PROCEDURE — 37000009 HC ANESTHESIA EA ADD 15 MINS: Performed by: OBSTETRICS & GYNECOLOGY

## 2019-02-04 PROCEDURE — 20600001 HC STEP DOWN PRIVATE ROOM

## 2019-02-04 PROCEDURE — 94761 N-INVAS EAR/PLS OXIMETRY MLT: CPT

## 2019-02-04 PROCEDURE — D9220A PRA ANESTHESIA: ICD-10-PCS | Mod: ,,, | Performed by: ANESTHESIOLOGY

## 2019-02-04 PROCEDURE — 27000221 HC OXYGEN, UP TO 24 HOURS

## 2019-02-04 PROCEDURE — 63600175 PHARM REV CODE 636 W HCPCS: Performed by: ANESTHESIOLOGY

## 2019-02-04 PROCEDURE — 27100025 HC TUBING, SET FLUID WARMER: Performed by: NURSE ANESTHETIST, CERTIFIED REGISTERED

## 2019-02-04 PROCEDURE — 36000709 HC OR TIME LEV III EA ADD 15 MIN: Performed by: OBSTETRICS & GYNECOLOGY

## 2019-02-04 PROCEDURE — 25000003 PHARM REV CODE 250: Performed by: NURSE ANESTHETIST, CERTIFIED REGISTERED

## 2019-02-04 PROCEDURE — 88307 TISSUE SPECIMEN TO PATHOLOGY - SURGERY: ICD-10-PCS | Mod: 26,,, | Performed by: PATHOLOGY

## 2019-02-04 PROCEDURE — 88305 CYTOLOGY SPECIMEN- MEDICAL CYTOLOGY (FLUID/WASH/BRUSH): ICD-10-PCS | Mod: 26,,, | Performed by: PATHOLOGY

## 2019-02-04 PROCEDURE — 88112 CYTOPATH CELL ENHANCE TECH: CPT | Mod: 26,,, | Performed by: PATHOLOGY

## 2019-02-04 PROCEDURE — 36000708 HC OR TIME LEV III 1ST 15 MIN: Performed by: OBSTETRICS & GYNECOLOGY

## 2019-02-04 PROCEDURE — 71000039 HC RECOVERY, EACH ADD'L HOUR: Performed by: OBSTETRICS & GYNECOLOGY

## 2019-02-04 PROCEDURE — S0077 INJECTION, CLINDAMYCIN PHOSP: HCPCS | Performed by: OBSTETRICS & GYNECOLOGY

## 2019-02-04 PROCEDURE — 88305 TISSUE EXAM BY PATHOLOGIST: CPT | Mod: 26,,, | Performed by: PATHOLOGY

## 2019-02-04 PROCEDURE — 99223 PR INITIAL HOSPITAL CARE,LEVL III: ICD-10-PCS | Mod: ,,, | Performed by: INTERNAL MEDICINE

## 2019-02-04 PROCEDURE — A4216 STERILE WATER/SALINE, 10 ML: HCPCS | Performed by: NURSE ANESTHETIST, CERTIFIED REGISTERED

## 2019-02-04 PROCEDURE — 88305 TISSUE EXAM BY PATHOLOGIST: CPT | Performed by: PATHOLOGY

## 2019-02-04 PROCEDURE — 25000003 PHARM REV CODE 250: Performed by: ANESTHESIOLOGY

## 2019-02-04 PROCEDURE — D9220A PRA ANESTHESIA: Mod: ,,, | Performed by: ANESTHESIOLOGY

## 2019-02-04 PROCEDURE — 58720 PR REMOVAL OF OVARY/TUBE(S): ICD-10-PCS | Mod: ,,, | Performed by: OBSTETRICS & GYNECOLOGY

## 2019-02-04 PROCEDURE — C9290 INJ, BUPIVACAINE LIPOSOME: HCPCS | Performed by: OBSTETRICS & GYNECOLOGY

## 2019-02-04 PROCEDURE — 88331 TISSUE SPECIMEN TO PATHOLOGY - SURGERY: ICD-10-PCS | Mod: 26,,, | Performed by: PATHOLOGY

## 2019-02-04 PROCEDURE — 88112 CYTOLOGY SPECIMEN- MEDICAL CYTOLOGY (FLUID/WASH/BRUSH): ICD-10-PCS | Mod: 26,,, | Performed by: PATHOLOGY

## 2019-02-04 PROCEDURE — 99223 1ST HOSP IP/OBS HIGH 75: CPT | Mod: ,,, | Performed by: INTERNAL MEDICINE

## 2019-02-04 RX ORDER — CALCIUM CARBONATE 200(500)MG
500 TABLET,CHEWABLE ORAL DAILY PRN
Status: DISCONTINUED | OUTPATIENT
Start: 2019-02-04 | End: 2019-02-06 | Stop reason: HOSPADM

## 2019-02-04 RX ORDER — BACLOFEN 10 MG/1
10 TABLET ORAL 2 TIMES DAILY
Status: DISCONTINUED | OUTPATIENT
Start: 2019-02-04 | End: 2019-02-06 | Stop reason: HOSPADM

## 2019-02-04 RX ORDER — HEPARIN SODIUM 1000 [USP'U]/ML
INJECTION, SOLUTION INTRAVENOUS; SUBCUTANEOUS
Status: DISCONTINUED | OUTPATIENT
Start: 2019-02-04 | End: 2019-02-04 | Stop reason: HOSPADM

## 2019-02-04 RX ORDER — DOCUSATE SODIUM 100 MG/1
100 CAPSULE, LIQUID FILLED ORAL NIGHTLY
Status: DISCONTINUED | OUTPATIENT
Start: 2019-02-04 | End: 2019-02-06 | Stop reason: HOSPADM

## 2019-02-04 RX ORDER — DEXTROSE MONOHYDRATE, SODIUM CHLORIDE, AND POTASSIUM CHLORIDE 50; 1.49; 4.5 G/1000ML; G/1000ML; G/1000ML
INJECTION, SOLUTION INTRAVENOUS CONTINUOUS
Status: DISCONTINUED | OUTPATIENT
Start: 2019-02-04 | End: 2019-02-05

## 2019-02-04 RX ORDER — PROPOFOL 10 MG/ML
VIAL (ML) INTRAVENOUS
Status: DISCONTINUED | OUTPATIENT
Start: 2019-02-04 | End: 2019-02-04

## 2019-02-04 RX ORDER — PHENYLEPHRINE HYDROCHLORIDE 10 MG/ML
INJECTION INTRAVENOUS
Status: DISCONTINUED | OUTPATIENT
Start: 2019-02-04 | End: 2019-02-04

## 2019-02-04 RX ORDER — DEXTROSE MONOHYDRATE, SODIUM CHLORIDE, AND POTASSIUM CHLORIDE 50; 1.49; 4.5 G/1000ML; G/1000ML; G/1000ML
INJECTION, SOLUTION INTRAVENOUS
Status: COMPLETED
Start: 2019-02-04 | End: 2019-02-04

## 2019-02-04 RX ORDER — ONDANSETRON 2 MG/ML
INJECTION INTRAMUSCULAR; INTRAVENOUS
Status: DISCONTINUED | OUTPATIENT
Start: 2019-02-04 | End: 2019-02-04

## 2019-02-04 RX ORDER — HYDROMORPHONE HYDROCHLORIDE 1 MG/ML
0.5 INJECTION, SOLUTION INTRAMUSCULAR; INTRAVENOUS; SUBCUTANEOUS
Status: DISCONTINUED | OUTPATIENT
Start: 2019-02-04 | End: 2019-02-06 | Stop reason: HOSPADM

## 2019-02-04 RX ORDER — ONDANSETRON 2 MG/ML
4 INJECTION INTRAMUSCULAR; INTRAVENOUS DAILY PRN
Status: COMPLETED | OUTPATIENT
Start: 2019-02-04 | End: 2019-02-04

## 2019-02-04 RX ORDER — FENTANYL CITRATE 50 UG/ML
25 INJECTION, SOLUTION INTRAMUSCULAR; INTRAVENOUS EVERY 5 MIN PRN
Status: DISCONTINUED | OUTPATIENT
Start: 2019-02-04 | End: 2019-02-04 | Stop reason: HOSPADM

## 2019-02-04 RX ORDER — IBUPROFEN 400 MG/1
800 TABLET ORAL EVERY 8 HOURS
Status: DISCONTINUED | OUTPATIENT
Start: 2019-02-04 | End: 2019-02-06 | Stop reason: HOSPADM

## 2019-02-04 RX ORDER — FENTANYL CITRATE 50 UG/ML
INJECTION, SOLUTION INTRAMUSCULAR; INTRAVENOUS
Status: DISCONTINUED | OUTPATIENT
Start: 2019-02-04 | End: 2019-02-04

## 2019-02-04 RX ORDER — ONDANSETRON 8 MG/1
8 TABLET, ORALLY DISINTEGRATING ORAL EVERY 8 HOURS PRN
Status: DISCONTINUED | OUTPATIENT
Start: 2019-02-04 | End: 2019-02-06 | Stop reason: HOSPADM

## 2019-02-04 RX ORDER — LIDOCAINE HCL/PF 100 MG/5ML
SYRINGE (ML) INTRAVENOUS
Status: DISCONTINUED | OUTPATIENT
Start: 2019-02-04 | End: 2019-02-04

## 2019-02-04 RX ORDER — ACETAMINOPHEN 10 MG/ML
1000 INJECTION, SOLUTION INTRAVENOUS EVERY 8 HOURS
Status: DISPENSED | OUTPATIENT
Start: 2019-02-04 | End: 2019-02-05

## 2019-02-04 RX ORDER — ALBUTEROL SULFATE 90 UG/1
AEROSOL, METERED RESPIRATORY (INHALATION)
Status: DISCONTINUED | OUTPATIENT
Start: 2019-02-04 | End: 2019-02-04

## 2019-02-04 RX ORDER — METHOCARBAMOL 500 MG/1
500 TABLET, FILM COATED ORAL 2 TIMES DAILY PRN
Status: DISCONTINUED | OUTPATIENT
Start: 2019-02-04 | End: 2019-02-06 | Stop reason: HOSPADM

## 2019-02-04 RX ORDER — MUPIROCIN 20 MG/G
1 OINTMENT TOPICAL 2 TIMES DAILY
Status: DISCONTINUED | OUTPATIENT
Start: 2019-02-04 | End: 2019-02-06 | Stop reason: HOSPADM

## 2019-02-04 RX ORDER — GABAPENTIN 100 MG/1
100 CAPSULE ORAL 2 TIMES DAILY
Status: DISCONTINUED | OUTPATIENT
Start: 2019-02-04 | End: 2019-02-06 | Stop reason: HOSPADM

## 2019-02-04 RX ORDER — OXYCODONE HYDROCHLORIDE 5 MG/1
5 TABLET ORAL EVERY 4 HOURS PRN
Status: DISCONTINUED | OUTPATIENT
Start: 2019-02-04 | End: 2019-02-06 | Stop reason: HOSPADM

## 2019-02-04 RX ORDER — SODIUM CHLORIDE 9 MG/ML
INJECTION, SOLUTION INTRAVENOUS CONTINUOUS
Status: DISCONTINUED | OUTPATIENT
Start: 2019-02-04 | End: 2019-02-04

## 2019-02-04 RX ORDER — SODIUM CHLORIDE 0.9 % (FLUSH) 0.9 %
3 SYRINGE (ML) INJECTION
Status: DISCONTINUED | OUTPATIENT
Start: 2019-02-04 | End: 2019-02-06 | Stop reason: HOSPADM

## 2019-02-04 RX ORDER — ALBUTEROL SULFATE 90 UG/1
2 AEROSOL, METERED RESPIRATORY (INHALATION) EVERY 6 HOURS PRN
Status: DISCONTINUED | OUTPATIENT
Start: 2019-02-04 | End: 2019-02-06 | Stop reason: HOSPADM

## 2019-02-04 RX ORDER — SODIUM CHLORIDE 0.9 % (FLUSH) 0.9 %
5 SYRINGE (ML) INJECTION
Status: DISCONTINUED | OUTPATIENT
Start: 2019-02-04 | End: 2019-02-04

## 2019-02-04 RX ORDER — OXYCODONE HYDROCHLORIDE 10 MG/1
10 TABLET ORAL EVERY 4 HOURS PRN
Status: DISCONTINUED | OUTPATIENT
Start: 2019-02-04 | End: 2019-02-06 | Stop reason: HOSPADM

## 2019-02-04 RX ORDER — CETIRIZINE HYDROCHLORIDE 10 MG/1
10 TABLET ORAL NIGHTLY
Status: DISCONTINUED | OUTPATIENT
Start: 2019-02-04 | End: 2019-02-06 | Stop reason: HOSPADM

## 2019-02-04 RX ORDER — ACETAMINOPHEN 10 MG/ML
INJECTION, SOLUTION INTRAVENOUS
Status: DISCONTINUED | OUTPATIENT
Start: 2019-02-04 | End: 2019-02-04

## 2019-02-04 RX ORDER — OCTREOTIDE ACETATE 500 UG/ML
500 INJECTION, SOLUTION INTRAVENOUS; SUBCUTANEOUS ONCE
Status: COMPLETED | OUTPATIENT
Start: 2019-02-04 | End: 2019-02-04

## 2019-02-04 RX ORDER — ROCURONIUM BROMIDE 10 MG/ML
INJECTION, SOLUTION INTRAVENOUS
Status: DISCONTINUED | OUTPATIENT
Start: 2019-02-04 | End: 2019-02-04

## 2019-02-04 RX ORDER — CLINDAMYCIN PHOSPHATE 900 MG/50ML
900 INJECTION, SOLUTION INTRAVENOUS
Status: COMPLETED | OUTPATIENT
Start: 2019-02-04 | End: 2019-02-04

## 2019-02-04 RX ORDER — SIMETHICONE 80 MG
1 TABLET,CHEWABLE ORAL 3 TIMES DAILY PRN
Status: DISCONTINUED | OUTPATIENT
Start: 2019-02-04 | End: 2019-02-06 | Stop reason: HOSPADM

## 2019-02-04 RX ORDER — CLONAZEPAM 0.5 MG/1
0.5 TABLET ORAL 2 TIMES DAILY PRN
Status: DISCONTINUED | OUTPATIENT
Start: 2019-02-04 | End: 2019-02-06 | Stop reason: HOSPADM

## 2019-02-04 RX ORDER — MIDAZOLAM HYDROCHLORIDE 1 MG/ML
INJECTION, SOLUTION INTRAMUSCULAR; INTRAVENOUS
Status: DISCONTINUED | OUTPATIENT
Start: 2019-02-04 | End: 2019-02-04

## 2019-02-04 RX ORDER — LIDOCAINE HYDROCHLORIDE 10 MG/ML
1 INJECTION, SOLUTION EPIDURAL; INFILTRATION; INTRACAUDAL; PERINEURAL ONCE
Status: COMPLETED | OUTPATIENT
Start: 2019-02-04 | End: 2019-02-04

## 2019-02-04 RX ORDER — GLYCOPYRROLATE 0.2 MG/ML
INJECTION INTRAMUSCULAR; INTRAVENOUS
Status: DISCONTINUED | OUTPATIENT
Start: 2019-02-04 | End: 2019-02-04

## 2019-02-04 RX ORDER — MUPIROCIN 20 MG/G
OINTMENT TOPICAL
Status: DISCONTINUED | OUTPATIENT
Start: 2019-02-04 | End: 2019-02-04 | Stop reason: HOSPADM

## 2019-02-04 RX ADMIN — POTASSIUM CHLORIDE, DEXTROSE MONOHYDRATE AND SODIUM CHLORIDE: 150; 5; 450 INJECTION, SOLUTION INTRAVENOUS at 06:02

## 2019-02-04 RX ADMIN — IBUPROFEN 800 MG: 200 TABLET, FILM COATED ORAL at 02:02

## 2019-02-04 RX ADMIN — FENTANYL CITRATE 25 MCG: 50 INJECTION, SOLUTION INTRAMUSCULAR; INTRAVENOUS at 09:02

## 2019-02-04 RX ADMIN — PHENYLEPHRINE HYDROCHLORIDE 100 MCG: 10 INJECTION INTRAVENOUS at 08:02

## 2019-02-04 RX ADMIN — BACLOFEN 10 MG: 10 TABLET ORAL at 09:02

## 2019-02-04 RX ADMIN — SODIUM CHLORIDE 1000 ML: 0.9 INJECTION, SOLUTION INTRAVENOUS at 06:02

## 2019-02-04 RX ADMIN — LIDOCAINE HYDROCHLORIDE 100 MG: 20 INJECTION, SOLUTION INTRAVENOUS at 07:02

## 2019-02-04 RX ADMIN — PROPOFOL 150 MG: 10 INJECTION, EMULSION INTRAVENOUS at 07:02

## 2019-02-04 RX ADMIN — DOCUSATE SODIUM 100 MG: 100 CAPSULE, LIQUID FILLED ORAL at 09:02

## 2019-02-04 RX ADMIN — GLYCOPYRROLATE 0.2 MG: 0.2 INJECTION, SOLUTION INTRAMUSCULAR; INTRAVENOUS at 07:02

## 2019-02-04 RX ADMIN — FENTANYL CITRATE 50 MCG: 50 INJECTION, SOLUTION INTRAMUSCULAR; INTRAVENOUS at 08:02

## 2019-02-04 RX ADMIN — LIDOCAINE HYDROCHLORIDE 0.2 MG: 10 INJECTION, SOLUTION EPIDURAL; INFILTRATION; INTRACAUDAL; PERINEURAL at 06:02

## 2019-02-04 RX ADMIN — MUPIROCIN: 20 OINTMENT TOPICAL at 06:02

## 2019-02-04 RX ADMIN — POTASSIUM CHLORIDE, DEXTROSE MONOHYDRATE AND SODIUM CHLORIDE: 150; 5; 450 INJECTION, SOLUTION INTRAVENOUS at 09:02

## 2019-02-04 RX ADMIN — ONDANSETRON 8 MG: 2 INJECTION INTRAMUSCULAR; INTRAVENOUS at 07:02

## 2019-02-04 RX ADMIN — ALBUTEROL SULFATE 2 PUFF: 90 AEROSOL, METERED RESPIRATORY (INHALATION) at 07:02

## 2019-02-04 RX ADMIN — CETIRIZINE HYDROCHLORIDE 10 MG: 10 TABLET, FILM COATED ORAL at 09:02

## 2019-02-04 RX ADMIN — MUPIROCIN 1 G: 20 OINTMENT TOPICAL at 10:02

## 2019-02-04 RX ADMIN — DEXMEDETOMIDINE HYDROCHLORIDE 0.5 MCG/KG/HR: 100 INJECTION, SOLUTION, CONCENTRATE INTRAVENOUS at 08:02

## 2019-02-04 RX ADMIN — FENTANYL CITRATE 50 MCG: 50 INJECTION, SOLUTION INTRAMUSCULAR; INTRAVENOUS at 07:02

## 2019-02-04 RX ADMIN — CLINDAMYCIN IN 5 PERCENT DEXTROSE 900 MG: 18 INJECTION, SOLUTION INTRAVENOUS at 07:02

## 2019-02-04 RX ADMIN — MIDAZOLAM HYDROCHLORIDE 2 MG: 1 INJECTION, SOLUTION INTRAMUSCULAR; INTRAVENOUS at 07:02

## 2019-02-04 RX ADMIN — ROCURONIUM BROMIDE 50 MG: 10 INJECTION, SOLUTION INTRAVENOUS at 07:02

## 2019-02-04 RX ADMIN — SODIUM CHLORIDE: 0.9 INJECTION, SOLUTION INTRAVENOUS at 07:02

## 2019-02-04 RX ADMIN — IBUPROFEN 800 MG: 200 TABLET, FILM COATED ORAL at 09:02

## 2019-02-04 RX ADMIN — FENTANYL CITRATE 100 MCG: 50 INJECTION, SOLUTION INTRAMUSCULAR; INTRAVENOUS at 07:02

## 2019-02-04 RX ADMIN — GABAPENTIN 100 MG: 100 CAPSULE ORAL at 10:02

## 2019-02-04 RX ADMIN — GENTAMICIN SULFATE 399 MG: 40 INJECTION, SOLUTION INTRAMUSCULAR; INTRAVENOUS at 07:02

## 2019-02-04 RX ADMIN — OCTREOTIDE ACETATE 500 MCG/HR: 1000 INJECTION, SOLUTION INTRAVENOUS; SUBCUTANEOUS at 07:02

## 2019-02-04 RX ADMIN — SUGAMMADEX 200 MG: 100 INJECTION, SOLUTION INTRAVENOUS at 09:02

## 2019-02-04 RX ADMIN — PHENYLEPHRINE HYDROCHLORIDE 100 MCG: 10 INJECTION INTRAVENOUS at 09:02

## 2019-02-04 RX ADMIN — ACETAMINOPHEN 1000 MG: 10 INJECTION, SOLUTION INTRAVENOUS at 01:02

## 2019-02-04 RX ADMIN — BACLOFEN 10 MG: 10 TABLET ORAL at 11:02

## 2019-02-04 RX ADMIN — ACETAMINOPHEN 1000 MG: 10 INJECTION, SOLUTION INTRAVENOUS at 07:02

## 2019-02-04 RX ADMIN — ONDANSETRON 4 MG: 2 INJECTION INTRAMUSCULAR; INTRAVENOUS at 11:02

## 2019-02-04 RX ADMIN — ONDANSETRON 4 MG: 2 INJECTION INTRAMUSCULAR; INTRAVENOUS at 12:02

## 2019-02-04 RX ADMIN — SODIUM CHLORIDE, SODIUM GLUCONATE, SODIUM ACETATE, POTASSIUM CHLORIDE, MAGNESIUM CHLORIDE, SODIUM PHOSPHATE, DIBASIC, AND POTASSIUM PHOSPHATE: .53; .5; .37; .037; .03; .012; .00082 INJECTION, SOLUTION INTRAVENOUS at 07:02

## 2019-02-04 RX ADMIN — PROMETHAZINE HYDROCHLORIDE 6.25 MG: 25 INJECTION INTRAMUSCULAR; INTRAVENOUS at 11:02

## 2019-02-04 RX ADMIN — ACETAMINOPHEN 1000 MG: 10 INJECTION, SOLUTION INTRAVENOUS at 09:02

## 2019-02-04 RX ADMIN — OCTREOTIDE ACETATE 500 MCG: 500 INJECTION, SOLUTION INTRAVENOUS; SUBCUTANEOUS at 07:02

## 2019-02-04 RX ADMIN — GABAPENTIN 100 MG: 100 CAPSULE ORAL at 09:02

## 2019-02-04 RX ADMIN — PHENYLEPHRINE HYDROCHLORIDE 100 MCG: 10 INJECTION INTRAVENOUS at 07:02

## 2019-02-04 NOTE — ANESTHESIA POSTPROCEDURE EVALUATION
"Anesthesia Post Evaluation    Patient: Yvonne Holliday    Procedure(s) Performed: Procedure(s) (LRB):  SALPINGO-OOPHORECTOMY (Bilateral)    Final Anesthesia Type: general  Patient location during evaluation: PACU  Patient participation: Yes- Able to Participate  Level of consciousness: awake and alert  Post-procedure vital signs: reviewed and stable  Pain management: adequate  Airway patency: patent  PONV status at discharge: No PONV  Anesthetic complications: no      Cardiovascular status: blood pressure returned to baseline  Respiratory status: unassisted  Hydration status: euvolemic  Follow-up not needed.        Visit Vitals  /61 (BP Location: Left arm, Patient Position: Lying)   Pulse (!) 55   Temp 36.6 °C (97.9 °F) (Temporal)   Resp 19   Ht 5' 6" (1.676 m)   Wt 108.9 kg (240 lb)   SpO2 98%   Breastfeeding? No   BMI 38.74 kg/m²       Pain/Hortensia Score: Pain Rating Prior to Med Admin: 4 (2/4/2019  1:29 PM)  Pain Rating Post Med Admin: 3 (2/4/2019  1:15 PM)  Hortensia Score: 9 (2/4/2019  1:15 PM)        "

## 2019-02-04 NOTE — INTERVAL H&P NOTE
The patient has been examined and the H&P has been reviewed:    I concur with the findings and no changes have occurred since H&P was written.    Anesthesia/Surgery risks, benefits and alternative options discussed and understood by patient/family.          Active Hospital Problems    Diagnosis  POA    Adnexal mass [N94.9]  Yes     Left          Resolved Hospital Problems   No resolved problems to display.

## 2019-02-04 NOTE — BRIEF OP NOTE
BRIEF OPERATIVE NOTE       SUMMARY      Surgery Date: 02/04/2019     SURGEON: Regan Harrison MD    ASSISTANT: Ladarius Wilson, PGY-4    PREOP DIAGNOSIS:   1. Adnexal Mass  2. History of bronchial carcinoid  3. Morbid Obesity     POSTOP DIAGNOSIS:    Same  Fibroma    PROCEDURES:   1. Exploratory laparotomy  2. Bilateral salpingo-oopherectomy    ANESTHESIA: general    FINDINGS/KEY COMPONENTS:   1. ~11x7x8 cm solid mass on left adnexa, frozen pathology consistent with fibroma  2. Grossly normal right ovary, grossly normal uterus  3. Grossly normal intra-abdominal cavity    ESTIMATED BLOOD LOSS: 100    COMPLICATIONS: None    PATHOLOGY:   Specimens (From admission, onward)    Start     Ordered    02/04/19 0806  Specimen to Pathology - Surgery  Once     Start Status   02/04/19 0806 In process       02/04/19 0815    02/04/19 0746  Cytology Specimen-Medical Cytology (Fluid/Wash/Brush)  Once     Question Answer Comment   Clinical Information: CA    Specific Site: abdominal washing    Other Requests: pelvic washing       Start Status   02/04/19 0746 In process       02/04/19 0751          Clair Wilson MD   PGY-4 Ob-gyn

## 2019-02-04 NOTE — PROGRESS NOTES
Still complain of feeling nauseated  After the zolfran and phenergan, Dr rose contacted, OK to give another dose of zolfran 4mg

## 2019-02-04 NOTE — PROGRESS NOTES
Recieved in Pacu intubated, Seen and examined by Dr Newby, Patient extubated at 9:20, RT present, Pt breathing spontanoeusly afterwards, Maintaining sats at 98 %, Vital signs within normal ranges

## 2019-02-04 NOTE — NURSING TRANSFER
Nursing Transfer Note      2/4/2019     Transfer To: 1034a    Transfer via stretcher    Transfer with mary pastrana    Transported by RN    Medicines sent: yes, inhaler    Chart send with patient: Yes    Notified: family

## 2019-02-04 NOTE — BRIEF OP NOTE
[facility]  Brief Operative Note    SUMMARY     Surgery Date: 2/4/2019     Surgeon(s) and Role:     * Regan Harrison MD - Primary     * NATHANIEL Wilson MD - Resident - Assisting        Pre-op Diagnosis:  Adnexal mass [N94.9]    Post-op Diagnosis:  Post-Op Diagnosis Codes:     * Adnexal mass [N94.9]    Procedure(s) (LRB):  SALPINGO-OOPHORECTOMY (Bilateral)    Anesthesia: General    Description of Procedure: .removal of both tubes and ovaries.     Description of the findings of the procedure: left ovarian fibroma.     Estimated Blood Loss: 100 mL       Total Fluids:1600ml   Urine Output:120 ml     Specimens:   Specimen (12h ago, onward)    Start     Ordered    02/04/19 0806  Specimen to Pathology - Surgery  Once     Comments:  1.  Left Tube and Ovary - FROZEN - Previously sent. 2. Right Tube and Ovary - PERMANENT     Start Status   02/04/19 0806 Collected (02/04/19 0816)       02/04/19 0815

## 2019-02-04 NOTE — PROGRESS NOTES
Still awaits to be allocated for a bed in 8th floor, Explain to family and Patient that we have requested a bed on Pt arrival here in Pacu, manages to stand up at bedside no problem remain stable and comfortable, Dr Harrison at bedside, seen and examined the Patient, intra op procedure and findings explain to patient and family

## 2019-02-04 NOTE — TRANSFER OF CARE
"Anesthesia Transfer of Care Note    Patient: Yvonne Holliday    Procedure(s) Performed: Procedure(s) (LRB):  SALPINGO-OOPHORECTOMY (Bilateral)    Patient location: PACU    Anesthesia Type: general    Transport from OR: Transported from OR intubated on 100% O2 by AMBU with assisted ventilation    Post pain: adequate analgesia    Post assessment: no apparent anesthetic complications    Level of consciousness: awake and alert    Nausea/Vomiting: no nausea/vomiting    Complications: none    Comments: Dr. iFgueroa called to bedside in PACU due to patient alert and coughing on tube. Respiratory therapist placed patient on 40% T-piece and O2 saturation 100%. Precedex gtt infusing at 1 mcg/kg/h and Octreotide at 500 mcg/h. Plan to perform NIF and extubate with Dr. Figueroa.       Last vitals:   Visit Vitals  BP (!) 120/58 (BP Location: Left arm, Patient Position: Lying)   Pulse 95   Temp 36.8 °C (98.2 °F) (Temporal)   Resp (!) 1   Ht 5' 6" (1.676 m)   Wt 108.9 kg (240 lb)   SpO2 100%   Breastfeeding? No   BMI 38.74 kg/m²     "

## 2019-02-04 NOTE — PROGRESS NOTES
Patient came to recovery room intubated with a 7.ETT at the 22cm toby.  Parameters were done and patient was extubated at 0920. Carolina and SUSAN were at bedside.

## 2019-02-04 NOTE — OP NOTE
DATE OF PROCEDURE:  02/04/2019.    PREOPERATIVE DIAGNOSES:  1.  Left adnexal mass.  2.  History of bronchial carcinoid, presently on Lanreotide    POSTOPERATIVE DIAGNOSES:  1.  Left adnexal mass.  2.  Left ovarian fibroma.    PROCEDURE PERFORMED:  Bilateral salpingo-oophorectomy.    SURGEON:  Regan Harrison M.D.    FIRST ASSISTANT:  NATHANIEL Wilson M.D. (RES).    ANESTHESIA:  GETA.    ESTIMATED BLOOD LOSS:  100 mL.    IV FLUIDS:  1600 mL.    URINE OUTPUT:  120 mL.    MEDICATIONS:  On octreotide drip intraoperatively.    OPERATIVE HISTORY:  This is a 63-year-old patient with a bronchial carcinoid.    She is presently on lanreotide.  She has an enlarging left adnexal mass and was   referred to me for further management.     OPERATIVE FINDINGS:  The mass is arising from the left ovary.  It is   approximately 10 x 12 cm.  Frozen section was a fibroma.  The right tube and   ovary were normal.  The uterus was small, but the patient had a very long cervix   that would have made it difficult to remove the uterus given the patient's   morbid obesity with a BMI of 39 and a weight of 243 pounds.  Additionally, the   patient has  considerable respiratory compromise and, in fact, had an asthmatic   attack as she was coming into the Operating Room.    OPERATIVE PROCEDURE IN DETAIL:  The patient was brought to the Operating Room   after induction of general anesthesia, [facility]  The   vulva and vagina were prepped with Betadine scrub and solution.  The abdomen was   prepped with ChloraPrep x2 given the patient's body surface.  The patient was   sterilely draped.  A Miles catheter was placed.    After timeout identifying the patient and procedure, a midline abdominal   incision was made slightly below the umbilicus and extended above the umbilicus   given her morbid obesity and large pannus.    Following this, the subcutaneous fat was incised down to the fascia.  The fascia   was incised.  We entered into  the peritoneal cavity.  There were no adhesions   from a prior  section.    At this point, the Bookwalter retractor was brought into position after   obtaining a cytologic washing.    The bowel was packed out of the pelvis with four laps.    We began on the left hand side.  The left fallopian tube was clamped, cut, and   suture ligated with 0-Vicryl tie.  The utero-ovarian ligament was then clamped,   cut and ligated with a 0-Vicryl tie.  The infundibulopelvic ligament was then   isolated, clamped, cut, and doubly ligated with 0-Vicryl ties.  This mass was   lifted out of the pelvis and sent for frozen section.    The utero-ovarian ligament was re-tied, although there was no bleeding.    The anterior leaf of the broad ligament on the right was opened.  The right   ureter was identified.  The right infundibulopelvic ligament was isolated,   clamped, cut, and doubly ligated with 0-Vicryl ties.  The utero-ovarian ligament   was then clamped, cut and ligated with a 0-Vicryl suture.    At this point, the patient has done well.  The uterus is small.  However, the   cervix is about 4 cm long and the patient's morbid obesity would make getting to   the end of the cervix difficult.    Given the patient's respiratory compromise, I elected not to proceed with a   hysterectomy.    At this point, two half sheets of Seprafilm were placed in the pelvis.  The   preperitoneal and perifascial tissues were infiltrated with 266 mg of Exparel;   this was 20 mL diluted in 100 mL of injectable normal saline, 60 mL was injected   on each side.  The laps were removed.  The remaining Seprafilm was then placed   between the omentum and the peritoneum.  The fascia was closed with a #1 looped   PDS suture beginning superiorly and inferiorly and tying approximately mid   incision.  Subcutaneous tissues were irrigated and reapproximated with a running   2-0 plain catgut.  The skin was then closed with a running 4-0 Monocryl suture    beginning superiorly and inferiorly and tying approximately mid incision.    The patient was then transferred to a stretcher and taken to the Recovery Room,   intubated, with plans for extubation in the PACU.      TRAVIS  dd: 02/04/2019 09:06:17 (CST)  td: 02/04/2019 09:21:17 (CST)  Doc ID   #[id number]  Job ID #[id number]    CC:

## 2019-02-04 NOTE — OP NOTE
DATE OF PROCEDURE:  02/04/2019.    PREOPERATIVE DIAGNOSES:  1.  Left adnexal mass.  2.  History of bronchial carcinoid, presently on lanreotide     POSTOPERATIVE DIAGNOSES:  1.  Left adnexal mass.  2.  Left ovarian fibroma.    PROCEDURE PERFORMED:  Bilateral salpingo-oophorectomy.    SURGEON:  Regan Harrison M.D.    FIRST ASSISTANT:  NATHANIEL Wilson M.D. (RES).    ANESTHESIA:  GETA.    ESTIMATED BLOOD LOSS:  100 mL.    IV FLUIDS:  1600 mL.    URINE OUTPUT:  120 mL.    MEDICATIONS:  On octreotide drip intraoperatively.    OPERATIVE HISTORY:  This is a 63-year-old patient with a bronchial carcinoid.    She is presently on lanreotide.  She has an enlarging left adnexal mass and was   referred to me for further management.    OPERATIVE FINDINGS:  The mass is arising from the left ovary.  It is   approximately 10 x 12 cm.  Frozen section was a fibroma.  The right tube and   ovary were normal.  The uterus was small, but the patient had a very long cervix   that would have made it difficult to remove the uterus given the patient's   morbid obesity with a BMI of 39 and a weight of 243 pounds.  Additionally, the   patient has considerable respiratory compromise and, in fact, had an asthmatic   attack as she was coming into the Operating Room.    OPERATIVE PROCEDURE IN DETAIL:  The patient was brought to the Operating Room   after induction of general anesthesia, [facility]  The   vulva and vagina were prepped with Betadine scrub and solution.  The abdomen was   prepped with ChloraPrep x2 given the patient's body surface.  The patient was   sterilely draped.  A Miles catheter was placed.    After timeout identifying the patient and procedure, a midline abdominal   incision was made slightly below the umbilicus and extended above the umbilicus   given her morbid obesity and large pannus.    Following this, the subcutaneous fat was incised down to the fascia.  The fascia   was incised.  We entered into  the peritoneal cavity.  There were no adhesions   from a prior  section.    At this point, the Bookwalter retractor was brought into position after   obtaining a cytologic washing.    The bowel was packed out of the pelvis with four laps.    We began on the left hand side.  The left fallopian tube was clamped, cut, and   suture ligated with 0-Vicryl tie.  The utero-ovarian ligament was then clamped,   cut and ligated with a 0-Vicryl tie.  The infundibulopelvic ligament was then   isolated, clamped, cut, and doubly ligated with 0-Vicryl ties.  This mass was   lifted out of the pelvis and sent for frozen section.    The utero-ovarian ligament was re-tied, although there was no bleeding.    The anterior leaf of the broad ligament on the right was opened.  The right   ureter was identified.  The right infundibulopelvic ligament was isolated,   clamped, cut, and doubly ligated with 0-Vicryl ties.  The utero-ovarian ligament   was then clamped, cut and ligated with a 0-Vicryl suture.    At this point, the patient has done well.  The uterus is small.  However, the   cervix is about 4 cm long and the patient's morbid obesity would make getting to   the end of the cervix difficult.    Given the patient's respiratory compromise, I elected not to proceed with a   hysterectomy.    At this point, two half sheets of Seprafilm were placed in the pelvis.  The   preperitoneal and perifascial tissues were infiltrated with 266 mg of Exparel;   this was 20 mL diluted in 100 mL of injectable normal saline, 60 mL was injected   on each side.  The laps were removed.  The remaining Seprafilm was then placed   between the omentum and the peritoneum.  The fascia was closed with a #1 looped   PDS suture beginning superiorly and inferiorly and tying approximately mid   incision.  Subcutaneous tissues were irrigated and reapproximated with a running   2-0 plain catgut.  The skin was then closed with a running 4-0 Monocryl suture    beginning superiorly and inferiorly and tying approximately mid incision.    The patient was then transferred to a stretcher and taken to the Recovery Room,   intubated, with plans for extubation in the PACU.      TRAVIS  dd: 02/04/2019 09:06:17 (CST)  td: 02/04/2019 09:21:17 (CST)  Doc ID   #[id number]  Job ID #[id number]    CC:

## 2019-02-05 LAB
BASOPHILS # BLD AUTO: 0.03 K/UL
BASOPHILS NFR BLD: 0.2 %
DIFFERENTIAL METHOD: ABNORMAL
EOSINOPHIL # BLD AUTO: 0.1 K/UL
EOSINOPHIL NFR BLD: 1.1 %
ERYTHROCYTE [DISTWIDTH] IN BLOOD BY AUTOMATED COUNT: 12.8 %
HCT VFR BLD AUTO: 37.8 %
HGB BLD-MCNC: 11.9 G/DL
IMM GRANULOCYTES # BLD AUTO: 0.05 K/UL
IMM GRANULOCYTES NFR BLD AUTO: 0.4 %
LYMPHOCYTES # BLD AUTO: 1.7 K/UL
LYMPHOCYTES NFR BLD: 13.9 %
MCH RBC QN AUTO: 30.7 PG
MCHC RBC AUTO-ENTMCNC: 31.5 G/DL
MCV RBC AUTO: 98 FL
MONOCYTES # BLD AUTO: 0.8 K/UL
MONOCYTES NFR BLD: 6.6 %
NEUTROPHILS # BLD AUTO: 9.6 K/UL
NEUTROPHILS NFR BLD: 77.8 %
NRBC BLD-RTO: 0 /100 WBC
PLATELET # BLD AUTO: 227 K/UL
PMV BLD AUTO: 10.5 FL
RBC # BLD AUTO: 3.87 M/UL
WBC # BLD AUTO: 12.39 K/UL

## 2019-02-05 PROCEDURE — 99024 POSTOP FOLLOW-UP VISIT: CPT | Mod: ,,, | Performed by: OBSTETRICS & GYNECOLOGY

## 2019-02-05 PROCEDURE — 36415 COLL VENOUS BLD VENIPUNCTURE: CPT

## 2019-02-05 PROCEDURE — 27201037 HC PRESSURE MONITORING SET UP

## 2019-02-05 PROCEDURE — 99024 PR POST-OP FOLLOW-UP VISIT: ICD-10-PCS | Mod: ,,, | Performed by: OBSTETRICS & GYNECOLOGY

## 2019-02-05 PROCEDURE — 20600001 HC STEP DOWN PRIVATE ROOM

## 2019-02-05 PROCEDURE — 85025 COMPLETE CBC W/AUTO DIFF WBC: CPT

## 2019-02-05 PROCEDURE — 25000003 PHARM REV CODE 250: Performed by: OBSTETRICS & GYNECOLOGY

## 2019-02-05 RX ADMIN — MUPIROCIN 1 G: 20 OINTMENT TOPICAL at 09:02

## 2019-02-05 RX ADMIN — SIMETHICONE CHEW TAB 80 MG 80 MG: 80 TABLET ORAL at 06:02

## 2019-02-05 RX ADMIN — IBUPROFEN 800 MG: 200 TABLET, FILM COATED ORAL at 06:02

## 2019-02-05 RX ADMIN — IBUPROFEN 800 MG: 200 TABLET, FILM COATED ORAL at 09:02

## 2019-02-05 RX ADMIN — GABAPENTIN 100 MG: 100 CAPSULE ORAL at 09:02

## 2019-02-05 RX ADMIN — SIMETHICONE CHEW TAB 80 MG 80 MG: 80 TABLET ORAL at 07:02

## 2019-02-05 RX ADMIN — BACLOFEN 10 MG: 10 TABLET ORAL at 09:02

## 2019-02-05 RX ADMIN — DOCUSATE SODIUM 100 MG: 100 CAPSULE, LIQUID FILLED ORAL at 09:02

## 2019-02-05 RX ADMIN — POTASSIUM CHLORIDE, DEXTROSE MONOHYDRATE AND SODIUM CHLORIDE: 150; 5; 450 INJECTION, SOLUTION INTRAVENOUS at 04:02

## 2019-02-05 RX ADMIN — POTASSIUM CHLORIDE, DEXTROSE MONOHYDRATE AND SODIUM CHLORIDE: 150; 5; 450 INJECTION, SOLUTION INTRAVENOUS at 01:02

## 2019-02-05 RX ADMIN — IBUPROFEN 800 MG: 200 TABLET, FILM COATED ORAL at 01:02

## 2019-02-05 RX ADMIN — CETIRIZINE HYDROCHLORIDE 10 MG: 10 TABLET, FILM COATED ORAL at 09:02

## 2019-02-05 RX ADMIN — METHOCARBAMOL TABLETS 500 MG: 500 TABLET, COATED ORAL at 09:02

## 2019-02-05 NOTE — PLAN OF CARE
Problem: Adult Inpatient Plan of Care  Goal: Plan of Care Review  Outcome: Ongoing (interventions implemented as appropriate)  POC reviewed with patient and family who both verbalized understanding. AAOx4. VSS on room air. Wore home CPAP overnight without any difficulty. Midline abdominal incision CDI with Telfa in place. Pain controlled with scheduled medications. Tolerating regular diet, denies any nausea. IVF infusing, Miles intact draining clear yellow urine, to be removed this AM per orders. BLE TEDS/SCDS in place. Safety precautions maintained, call light within reach.  Remains free from falls and injury. No acute events. No distress noted. Will continue to monitor. Family at bedside.

## 2019-02-05 NOTE — CONSULTS
Consult Note    Consults  SUBJECTIVE:     History of Present Illness:  Patient is a 63 y.o. female with PMHx of carcinoid tumor of lung and bronchus, hereditary spastic paraparesis, gout,asthma, multinodular thyroid and HTN who presents to the hospital for AUDREY/BSO for left adnexal mass. 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 In July 2009, the pt was converted to octreotide LAR, while she had previously been on short acting med; this change had helped mildly with cough but also made improvements in flushing. The pt was followed in [place], [place] by Dr. Garibay, and changed to Lanreotide in 07/2016.    Left andexal mass was noted on 10/1/14 initially, 3 cm at that time. In July 2015, the left adnexal mass is 5.2 x 4.1 x 4.1 cm, with a  of 11 at that time. This has continued to grow and measured to 11.8x 8.1 x 7.3 cm mass in September 2018. The pt had enlarging left adnexal massess, with  elevated to 46.   She was noted to have five pillow orthopnea.       The pt was on an octreotide drip in the OR. She did not remember the asthma attack from OR but states that she feels better today. Pt is getting inhalers and IS is at bedside. She denies any significant pain from procedure. We discussed with her to let us know of any diarrhea, flushing, worsening dyspnea..    Review of patient's allergies indicates:   Allergen Reactions    Epinephrine      Neuroendocrine Tumor patient      Pcn [penicillins]      Rash   Can take Keflex     Past Medical History:   Diagnosis Date    Abnormal CT of liver 7/15/2016    Adnexal mass 7/15/2016    Asthma     Carcinoid syndrome 7/15/2016    Carcinoid tumor of lung 09/2005    Coughing     Drug therapy 2010    sandostatin    HTN (hypertension)     Malignant carcinoid  tumor of bronchus and lung 2016    Spastic paraplegia, hereditary     walks with cane    Thickened endometrium 2019    Wheezing      Past Surgical History:   Procedure Laterality Date    BREAST SURGERY      breast reduction     SECTION      SALPINGO-OOPHORECTOMY Bilateral 2019    Performed by Regan Harrison MD at [facility] OR [address] [place] [address]     Family History   Problem Relation Age of Onset    Cancer Mother         unknown primary    Cancer Maternal Aunt         gyn of some type    Cancer Paternal Grandfather         prostate    Cancer Maternal Aunt         bone cancer     Polycythemia Father     Heart disease Father 52    Aortic aneurysm Father     Breast cancer Neg Hx     Colon cancer Neg Hx      Social History     Tobacco Use    Smoking status: Never Smoker    Smokeless tobacco: Never Used   Substance Use Topics    Alcohol use: No    Drug use: No     ROS   Constitutional: Negative for chills, fatigue and fever.   Respiratory: Positive for cough and shortness of breath. Negative for wheezing.    Cardiovascular: Negative for chest pain, palpitations and leg swelling.   Gastrointestinal: Negative for abdominal pain, constipation, diarrhea, nausea and vomiting.   Genitourinary: Negative for difficulty urinating, dysuria, frequency, genital sores, hematuria, urgency, vaginal bleeding, vaginal discharge and vaginal pain.   Neurological: Negative for weakness.   Hematological: Negative for adenopathy. Does not bruise/bleed easily.   Psychiatric/Behavioral: The patient is not nervous/anxious.        OBJECTIVE:     Vital Signs:  Temp:  [98.3 °F (36.8 °C)-98.4 °F (36.9 °C)]   Pulse:  [77-88]   Resp:  [17-20]   BP: (148-176)/(70-83)   SpO2:  [93 %-97 %]     Physical Exam   Constitutional: She is oriented to person, place, and time. She appears well-developed and well-nourished. No distress.   HENT:   Head: Normocephalic and atraumatic.   Eyes: Conjunctivae and EOM are normal. Pupils are  equal, round, and reactive to light.   Neck: Normal range of motion. Neck supple.   Cardiovascular: Normal rate, regular rhythm and normal heart sounds. Exam reveals no gallop and no friction rub.   No murmur heard.  Pulmonary/Chest: Effort normal and breath sounds normal. No respiratory distress. She has no wheezes. She has no rales.   Abdominal: Soft. Bowel sounds are normal. She exhibits no distension. There is no tenderness. There is no guarding.   Musculoskeletal: Normal range of motion. She exhibits no edema.   Neurological: She is alert and oriented to person, place, and time. No cranial nerve deficit or sensory deficit.   Skin: Skin is warm and dry. No rash noted.   Bruising in upper extremities          Laboratory:  CBC:   Recent Labs   Lab 02/05/19  0505   WBC 12.39   RBC 3.87*   HGB 11.9*   HCT 37.8      MCV 98   MCH 30.7   MCHC 31.5*     BMP: No results for input(s): GLU, NA, K, CL, CO2, BUN, CREATININE, CALCIUM, MG in the last 168 hours.  CMP: No results for input(s): GLU, CALCIUM, ALBUMIN, PROT, NA, K, CO2, CL, BUN, CREATININE, ALKPHOS, ALT, AST, BILITOT in the last 168 hours.  LFTs: No results for input(s): ALT, AST, ALKPHOS, BILITOT, PROT, ALBUMIN in the last 168 hours.  Coagulation: No results for input(s): LABPROT, INR, APTT in the last 168 hours.  Cardiac markers: No results for input(s): CKMB, CPKMB, TROPONINT, TROPONINI, MYOGLOBIN in the last 168 hours.  ABGs: No results for input(s): PH, PCO2, PO2, HCO3, POCSATURATED, BE in the last 168 hours.  Microbiology Results (last 7 days)     ** No results found for the last 168 hours. **        Specimen (12h ago, onward)    None        No results for input(s): COLORU, CLARITYU, SPECGRAV, PHUR, PROTEINUA, GLUCOSEU, BILIRUBINCON, BLOODU, WBCU, RBCU, BACTERIA, MUCUS, NITRITE, LEUKOCYTESUR, UROBILINOGEN, HYALINECASTS in the last 168 hours.    Diagnostic Results:  \]cx  EXAMINATION:  CT ABDOMEN PELVIS WITH CONTRAST    CLINICAL HISTORY:  pelvic mass;  Unspecified condition associated with female genital organs and menstrual cycle    TECHNIQUE:  Axial images of the chest, abdomen, and pelvis were acquired after the use of 100 cc [id number] IV contrast. Approximately 30 cc of Omnipaque 350 were administered for oral contrast.  Coronal and sagittal reconstructions were also obtained    COMPARISON:  Outside CT 09/14/2018, 08/21/2017, CT abdomen pelvis 07/13/2016, CT abdomen 12/29/2009    FINDINGS:  Neck: Multinodular thyroid gland    Thoracic soft tissues: No significant abnormalities.    Aorta: Normal 3 branch vessel pattern with aorta maintaining normal caliber and contour with mildly tortuous course.  No significant calcific atherosclerosis.    Heart: Normal in size without pericardial effusion.    Chani/Mediastinum: No significant lymphadenopathy    Lungs: Well expanded bilaterally without acute consolidation, pleural effusion, or pneumothorax.  Redemonstration of numerous scattered rounded lesions of varying sizes in a pattern suggestive for metastatic disease.  Overall, lesions appear grossly stable dating back to most recent in-house study dated 07/13/2016.  Right-sided index lesion located within the right lower lobe measures 2.6 cm (axial series 2, image 56) with index lesion within the left lung located within the left lower lobe measuring 1.1 cm (axial series 2, image 60).    Liver: Normal in size with a 4.6 cm right hepatic simple cyst and additional scattered subcentimeter areas of hypoattenuation within the left hepatic lobe too small to fully characterize but likely representing additional cysts.  Portal vein is patent.    Gallbladder: Layering hyperdense material likely reflecting small stones.  No evidence of abnormal gallbladder wall thickening or pericholecystic fluid to suggest acute cholecystitis.    Bile Ducts: No evidence of dilated ducts.    Pancreas: Mild fatty involution.    Spleen: Unremarkable.    Adrenals: Unremarkable.    Kidneys/ Ureters:  Normal in size and location. Normal concentration of contrast material.  No hydronephrosis or nephrolithiasis.    Bladder: No evidence of wall thickening.    Reproductive organs: Left ovarian tumor which demonstrates enlargement when compared to study dated July 2016.  Lesion measures approximately 11 x 7 x 8 cm on today's examination.  Tumor shows no  specific/diagnostic features.    GI Tract/Mesentery: No evidence of bowel obstruction or inflammation.    Peritoneal Space: Trace perihepatic ascites.  No free air.    Retroperitoneum:  No significant adenopathy.    Abdominal wall:  Unremarkable.    Vasculature: No significant atherosclerosis or aneurysm.    Bones: No acute fracture or aggressive osseous lesion.  Mild S-shaped scoliosis.      Impression       Large left ovarian tumor which demonstrates interval enlargement when compared to study dated July 2016.    Redemonstration of numerous scattered pulmonary lesions of varying sizes in a pattern suggestive for metastatic disease.  Overall, these lesions appear grossly stable dating back to July 2016.    Cholelithiasis without evidence of acute cholecystitis.    Multinodular thyroid gland.    Hepatic cysts.    Trace perihepatic ascites.    Additional findings as above.    RECIST SUMMARY:    Date of prior examination for comparison: 07/13/2016    Lesion 1: Right lower lobe pulmonary nodule.  2.6 cm. Series 2 image 56.  Prior measurement 2.6 cm.    Lesion 2: Left lower lobe pulmonary nodule.  1.1 cm. Series 2 image 60.  Prior measurement 1.1 cm.    Lesion 3: Left ovarian mass.  10.9 cm. Series 2 image 156.  Prior measurement 7.1 cm.    This report was flagged in Epic as abnormal.    Electronically signed by resident: Gopal Mendoza  Date: 12/15/2018  Time: 13:52    Electronically signed by: Juan Kinsey MD  Date: 12/15/2018  Time: 15:13         ASSESSMENT/PLAN:   Plan:    Carcinoid tumor  - multiple pulmonary masses; left adnexal mass also excised and sent of for  pathology  - Pt with mulitple rounded lesions throughout lungs, one of index lesionsin R lower lobe is 2.6 cm and another one in left lower lobe is 1.1 cm  - recently on Lanreotide as outpatient  - on Octroetide during surgery on 2/4  -  Pt is currently asx  - Asked that we be informed if she has any worsening dyspnea, flushing, or diarrhea    Discussed with pt and relative in room today.    Discussed with Dr. Zach Noland MD  Hematology/Oncology Fellow, PGY IV  [facility]      I have reviewed the notes, assessments, and/or procedures performed by the housestaff, as above.  I have personally interviewed and examined the patient at the beside, and rounded with the housestaff. I concur with her/his assessment and plan and the documentation of Yvonne Holliday.  I, Dr. Chau Serrano, personally spent more than 70 mins during this encounter, greater than 50% was spent in direct counseling and/or coordination of care.     Chau Serrano M.D., M.S., F.A.C.P.  Hematology/Oncology Attending  [facility]

## 2019-02-05 NOTE — PLAN OF CARE
POD 1 s/p BSO. VSS. T. Max 99.1. Maintaining O2 sats of 93-99% on room air. Labs stable. No nausea or vomiting. MD to advance diet as tolerated. Patient currently receiving IVF: dextrose 5 % and 0.45 % NaCl with KCl 20 mEq infusion : 125 mL/hr : Intravenous : Continuous. Miles removed. No BM or flatus at this time. Patient encouraged to ambulate. Pain controlled with scheduled pain meds (PRN ordered for breakthrough pain): IV Tylenol 1000 mg every 8 hours and ibuprofen tablet 800  Mg every 8 hours. Routine post op care in place. CM discussed patient with the Gyn Onc resident and updated the SW on patient status. No discharge needs identified. Patient post-op appt already scheduled. CM added post-op appt to patient's AVS. CM to continue to follow with the team.    Zora Bustos, RN, BSN, CM  [facility]  Nurse - Med Onc/Gyn Onc  [phone]

## 2019-02-05 NOTE — PROGRESS NOTES
[facility]  Obstetrics & Gynecology  Progress Note    Patient Name: Yvonne Holliday  MRN: [id number]  Admission Date: 2/4/2019  Primary Care Provider: Primary Doctor No  Principal Problem: S/P BSO (bilateral salpingo-oophorectomy)    Subjective:     Interval History: POD#1 s/p bilateral salpingo-oopherectomy Patient is doing well this morning. She denies nausea, vomiting, fever or chills.  Patient reports mild abdominal pain that is well relieved by scheduled IV  medications. She denies vaginal bleeding. She has not ambulated yet. Miles in place draining clear urine. She has not passed flatus, and has not had BM. She is tolerating small amount of PO without difficulty.      Scheduled Meds:   acetaminophen  1,000 mg Intravenous Q8H    baclofen  10 mg Oral BID    cetirizine  10 mg Oral QHS    docusate sodium  100 mg Oral QHS    gabapentin  100 mg Oral BID    ibuprofen  800 mg Oral Q8H    mupirocin  1 g Nasal BID     Continuous Infusions:   dextrose 5 % and 0.45 % NaCl with KCl 20 mEq 125 mL/hr at 02/05/19 0434     PRN Meds:albuterol, calcium carbonate, clonazePAM, HYDROmorphone, methocarbamol, ondansetron, oxyCODONE, oxyCODONE, promethazine (PHENERGAN) IVPB, simethicone, sodium chloride 0.9%    Review of patient's allergies indicates:   Allergen Reactions    Epinephrine      Neuroendocrine Tumor patient      Pcn [penicillins]      Rash   Can take Keflex       Objective:     Vital Signs (Most Recent):  Temp: 98.3 °F (36.8 °C) (02/05/19 0457)  Pulse: 80 (02/05/19 0457)  Resp: 18 (02/05/19 0457)  BP: (!) 144/66 (02/05/19 0457)  SpO2: (!) 93 % (02/05/19 0457) Vital Signs (24h Range):  Temp:  [97.7 °F (36.5 °C)-99.1 °F (37.3 °C)] 98.3 °F (36.8 °C)  Pulse:  [55-95] 80  Resp:  [16-20] 18  SpO2:  [93 %-100 %] 93 %  BP: (100-179)/(54-87) 144/66     Weight: 108.9 kg (240 lb)  Body mass index is 38.74 kg/m².  No LMP recorded. Patient is postmenopausal.    I&O (Last 24H):    Intake/Output Summary (Last  24 hours) at 2/5/2019 0604  Last data filed at 2/5/2019 0434  Gross per 24 hour   Intake 4493.33 ml   Output 3470 ml   Net 1023.33 ml       Physical Exam:   Constitutional: She is oriented to person, place, and time. She appears well-developed.       Cardiovascular: Normal rate, regular rhythm, normal heart sounds and intact distal pulses.  Exam reveals no gallop, no friction rub and no clubbing.    No murmur heard.   Pulmonary/Chest: Effort normal and breath sounds normal. No respiratory distress. She has no wheezes.       Abdominal: Soft. She exhibits no distension. There is no tenderness. Incision clean dry and intact, island dressing in place.  Neurological: She is alert and oriented to person, place, and time.    Skin: Nails show no clubbing.    Psychiatric: She has a normal mood and affect.     Laboratory:  CBC: No results for input(s): WBC, RBC, HGB, HCT, PLT, MCV, MCH, MCHC in the last 48 hours.      Assessment/Plan:     Active Diagnoses:    Diagnosis Date Noted POA    PRINCIPAL PROBLEM:  S/P BSO (bilateral salpingo-oophorectomy, 2/2 left-sided fibroma) [Z90.722] 02/04/2019 Not Applicable    Adnexal mass [N94.9] 07/15/2016 Yes      Problems Resolved During this Admission:       POD#1  - Doing well, no acute events overnight  - Continue IS, encourage ambulation  - Continue GLORIA/SCD's DVT PPX  - Pain well controlled, continue current regimen, tolerating PO  - Labs pending  - Voiding trial this AM    Asthma  - Continue symbicort  - PRN albuterol    Hypertension  BP: (100-179)/(54-87) 144/66  - BP stable overnight  - Home diovan, and hydrodiuril held, restart as needed      Carcinoid  - S/p octreotride in PACU  - No issues overnight        B Ladarius Wilson MD  Obstetrics & Gynecology  [facility]

## 2019-02-06 VITALS
HEART RATE: 80 BPM | OXYGEN SATURATION: 95 % | BODY MASS INDEX: 38.57 KG/M2 | TEMPERATURE: 98 F | WEIGHT: 240 LBS | DIASTOLIC BLOOD PRESSURE: 63 MMHG | RESPIRATION RATE: 17 BRPM | SYSTOLIC BLOOD PRESSURE: 136 MMHG | HEIGHT: 66 IN

## 2019-02-06 PROCEDURE — 99024 POSTOP FOLLOW-UP VISIT: CPT | Mod: ,,, | Performed by: OBSTETRICS & GYNECOLOGY

## 2019-02-06 PROCEDURE — 99024 PR POST-OP FOLLOW-UP VISIT: ICD-10-PCS | Mod: ,,, | Performed by: OBSTETRICS & GYNECOLOGY

## 2019-02-06 PROCEDURE — 25000003 PHARM REV CODE 250: Performed by: OBSTETRICS & GYNECOLOGY

## 2019-02-06 RX ORDER — OXYCODONE AND ACETAMINOPHEN 5; 325 MG/1; MG/1
1 TABLET ORAL EVERY 4 HOURS PRN
Qty: 20 TABLET | Refills: 0 | Status: SHIPPED | OUTPATIENT
Start: 2019-02-06 | End: 2019-03-08

## 2019-02-06 RX ORDER — IBUPROFEN 800 MG/1
800 TABLET ORAL EVERY 8 HOURS
Qty: 60 TABLET | Refills: 0 | Status: SHIPPED | OUTPATIENT
Start: 2019-02-06 | End: 2021-04-26

## 2019-02-06 RX ORDER — HYDROCHLOROTHIAZIDE 25 MG/1
25 TABLET ORAL DAILY
Status: DISCONTINUED | OUTPATIENT
Start: 2019-02-06 | End: 2019-02-06

## 2019-02-06 RX ADMIN — BACLOFEN 10 MG: 10 TABLET ORAL at 08:02

## 2019-02-06 RX ADMIN — IBUPROFEN 800 MG: 200 TABLET, FILM COATED ORAL at 06:02

## 2019-02-06 RX ADMIN — GABAPENTIN 100 MG: 100 CAPSULE ORAL at 08:02

## 2019-02-06 RX ADMIN — MUPIROCIN 1 G: 20 OINTMENT TOPICAL at 08:02

## 2019-02-06 NOTE — PLAN OF CARE
POD 1 s/p BSO. VSS. Afebrile. Labs stable. No nausea/vomiting. Tolerating diet. Mild abdominal pain relieved with PO pain meds. No bleeding. Patient ambulating and voiding. Patient passing flatus. Plans for patient to discharge home today. No discharge needs identified. Discharge and follow-up instructions to be completed by the bedside nurse.    Future Appointments   Date Time Provider Department Center   3/8/2019  9:45 AM Regan Harrison MD [facility] GYN ONC [facility]   4/12/2019 11:00 AM [facility] PET CT1 LIMIT 500 LBS [facility] PET CT [facility]   5/7/2019 10:40 AM Lobo Smith DO, FACP [facility] TUMOR [facility]        02/06/19 1054   Final Note   Assessment Type Final Discharge Note   Anticipated Discharge Disposition Home   What phone number can be called within the next 1-3 days to see how you are doing after discharge? ([phone])   Hospital Follow Up  Appt(s) scheduled? Yes   Discharge plans and expectations educations in teach back method with documentation complete? Yes  (per staff)

## 2019-02-06 NOTE — PROGRESS NOTES
[facility]  Obstetrics & Gynecology  Progress Note    Patient Name: Yvonne Holliday  MRN: [id number]  Admission Date: 2/4/2019  Primary Care Provider: Primary Doctor No  Principal Problem: S/P BSO (bilateral salpingo-oophorectomy)    Subjective:     Interval History: POD#2 s/p bilateral salpingo-oopherectomy Patient is doing well this morning. She denies nausea, vomiting, fever or chills.  Patient reports mild abdominal pain that is well relieved by scheduled NSAID medications. She denies vaginal bleeding. She is ambulating and voiding without difficulty. She is passing flatus and has not had BM. She is tolerating a full diet without issue.      Scheduled Meds:   baclofen  10 mg Oral BID    cetirizine  10 mg Oral QHS    docusate sodium  100 mg Oral QHS    gabapentin  100 mg Oral BID    ibuprofen  800 mg Oral Q8H    mupirocin  1 g Nasal BID     Continuous Infusions:    PRN Meds:albuterol, calcium carbonate, clonazePAM, HYDROmorphone, methocarbamol, ondansetron, oxyCODONE, oxyCODONE, promethazine (PHENERGAN) IVPB, simethicone, sodium chloride 0.9%    Review of patient's allergies indicates:   Allergen Reactions    Epinephrine      Neuroendocrine Tumor patient      Pcn [penicillins]      Rash   Can take Keflex       Objective:     Vital Signs (Most Recent):  Temp: 98 °F (36.7 °C) (02/06/19 0327)  Pulse: 85 (02/06/19 0327)  Resp: 18 (02/06/19 0327)  BP: (!) 145/69 (02/06/19 0327)  SpO2: 99 % (02/06/19 0327) Vital Signs (24h Range):  Temp:  [97.3 °F (36.3 °C)-98.4 °F (36.9 °C)] 98 °F (36.7 °C)  Pulse:  [77-88] 85  Resp:  [15-20] 18  SpO2:  [93 %-99 %] 99 %  BP: (145-176)/(69-83) 145/69     Weight: 108.9 kg (240 lb)  Body mass index is 38.74 kg/m².  No LMP recorded. Patient is postmenopausal.    I&O (Last 24H):    Intake/Output Summary (Last 24 hours) at 2/6/2019 0732  Last data filed at 2/6/2019 0440  Gross per 24 hour   Intake 3744.17 ml   Output 4000 ml   Net -255.83 ml       Physical Exam:    Constitutional: She is oriented to person, place, and time. She appears well-developed.       Cardiovascular: Normal rate, regular rhythm, normal heart sounds and intact distal pulses.  Exam reveals no gallop, no friction rub and no clubbing.    No murmur heard.   Pulmonary/Chest: Effort normal and breath sounds normal. No respiratory distress. She has no wheezes.       Abdominal: Soft. She exhibits no distension. There is no tenderness. Incision clean dry and intact, dressing removed, steri strips in place.   Neurological: She is alert and oriented to person, place, and time.    Skin: Nails show no clubbing.    Psychiatric: She has a normal mood and affect.     Laboratory:  CBC:   Recent Labs   Lab 02/05/19  0505   WBC 12.39   RBC 3.87*   HGB 11.9*   HCT 37.8      MCV 98   MCH 30.7   MCHC 31.5*         Assessment/Plan:     Active Diagnoses:    Diagnosis Date Noted POA    PRINCIPAL PROBLEM:  S/P BSO (bilateral salpingo-oophorectomy, 2/2 left-sided fibroma) [Z90.722] 02/04/2019 Not Applicable    Adnexal mass [N94.9] 07/15/2016 Yes      Problems Resolved During this Admission:       POD#2  - Doing well, no acute events overnight  - Continue IS, encourage ambulation  - Continue GLORIA/SCD's DVT PPX  - Pain well controlled, continue current regimen, tolerating PO    Asthma  - Continue symbicort  - PRN albuterol    Hypertension  - BP: (145-176)/(69-83) 145/69  - BP stable overnight  - Home diovan, and hydrodiuril held  - Patient to restart at home after discharge      Carcinoid  - S/p octreotride in PACU  - Continues to be without issue      Discharge today, meeting milestones    B Ladarius Wilson MD  Obstetrics & Gynecology  [facility]

## 2019-02-06 NOTE — NURSING
Discharge instructions, medication/prescription, and follow-up appointments complete and paperwork handed to patient. Patient and her sister verbalize understanding with no further questions at this time. No acute distress or needs at this time. Pt request wheelchair with cart for transport to hospital exit for discharge home.

## 2019-02-06 NOTE — DISCHARGE SUMMARY
[facility]  Obstetrics & Gynecology  Discharge Summary    Patient Name: Yvonne Holliday  MRN: [id number]  Admission Date: 2/4/2019  Hospital Length of Stay: 2 days  Discharge Date and Time:  02/06/2019 8:58 AM  Attending Physician: Regan Harrison MD   Discharging Provider: AAKASH Wilson MD  Primary Care Provider: Primary Doctor No    HPI: Underwent BSO on 2/4 for adnexal mass.    Hospital Course: Patient presented for scheduled procedure.  Please see OP note for further details. Tolerated procedure well and patient was taken to recovery in a stable condition. She remained in hospital two nights for observation. Prior to discharge patient was able to void, ambulate, tolerate PO and pain was well controlled with PO meds. Patient was given routine post-op instructions for which patient voiced understanding. Patient was subsequently discharged home.      Procedure(s) (LRB):  SALPINGO-OOPHORECTOMY (Bilateral)       Pending Diagnostic Studies:     Procedure Component Value Units Date/Time    Cytology Specimen-Medical Cytology (Fluid/Wash/Brush) [[id number]] Collected:  02/04/19 0746    Order Status:  Sent Lab Status:  In process Updated:  02/04/19 1139    Specimen:  Peritoneal/abdominal/pelvic wash         Final Active Diagnoses:    Diagnosis Date Noted POA    PRINCIPAL PROBLEM:  S/P BSO (bilateral salpingo-oophorectomy, 2/2 left-sided fibroma) [Z90.722] 02/04/2019 Not Applicable    Adnexal mass [N94.9] 07/15/2016 Yes      Problems Resolved During this Admission:        Discharged Condition: good    Disposition:     Follow Up:  Follow-up Information     Regan Harrison MD. Go on 3/8/2019.    Specialty:  Gynecologic Oncology  Why:  Follow-Up Appointment at 9:45 am  Contact information:  Sh[address]omo [address]ALDO KENYA  [facility] [address]  [phone]                 Patient Instructions:      Call MD for:  temperature >100.4     Call MD for:  persistent nausea and vomiting or diarrhea     Call MD for:  severe  uncontrolled pain     Call MD for:  redness, tenderness, or signs of infection (pain, swelling, redness, odor or green/yellow discharge around incision site)     Call MD for:  difficulty breathing or increased cough     Call MD for:  severe persistent headache     Call MD for:  increased confusion or weakness     Medications:  Reconciled Home Medications:      Medication List      START taking these medications    ibuprofen 800 MG tablet  Commonly known as:  ADVIL,MOTRIN  Take 1 tablet (800 mg total) by mouth every 8 (eight) hours.     oxyCODONE-acetaminophen 5-325 mg per tablet  Commonly known as:  PERCOCET  Take 1 tablet by mouth every 4 (four) hours as needed for Pain.        CONTINUE taking these medications    allopurinol 100 MG tablet  Commonly known as:  ZYLOPRIM  TK 1 T PO  D     baclofen 10 MG tablet  Commonly known as:  LIORESAL  Take 10 mg by mouth 2 (two) times daily.     budesonide-formoterol 80-4.5 mcg 80-4.5 mcg/actuation Hfaa  Commonly known as:  SYMBICORT  Inhale 1 puff into the lungs 2 (two) times daily.     cetirizine 10 MG tablet  Commonly known as:  ZYRTEC  Take 10 mg by mouth every evening.     clonazePAM 0.5 MG tablet  Commonly known as:  KLONOPIN  Take 0.5 mg by mouth 2 (two) times daily as needed for Anxiety.     dextromethorphan 30 mg/5 mL liquid  Commonly known as:  DELSYM  Take 60 mg by mouth nightly as needed for Cough.     docusate sodium 100 MG capsule  Commonly known as:  COLACE  Take 100 mg by mouth every evening.     gabapentin 100 MG capsule  Commonly known as:  NEURONTIN  Take 100 mg by mouth 2 (two) times daily.     hydroCHLOROthiazide 25 MG tablet  Commonly known as:  HYDRODIURIL  as needed.     latanoprost 0.005 % ophthalmic solution  Place 1 drop into both eyes every evening.     loratadine 10 mg tablet  Commonly known as:  CLARITIN  Take 10 mg by mouth every morning.     methocarbamol 500 MG Tab  Commonly known as:  ROBAXIN  Take 500 mg by mouth 2 (two) times daily as  needed.     multivitamin with minerals tablet  Take 1 tablet by mouth once daily.     OCUVITE EXTRA ORAL  Take by mouth.     POTASSIUM CITRATE ORAL  Take 99 mg by mouth every morning.     PROAIR HFA 90 mcg/actuation inhaler  Generic drug:  albuterol  Inhale 2 puffs into the lungs every 6 (six) hours as needed for Wheezing.     ranitidine 75 MG tablet  Commonly known as:  ZANTAC  Take 150 mg by mouth nightly.     SOMATULINE DEPOT 120 mg/0.5 mL Syrg  Generic drug:  lanreotide  Inject 0.5 mLs (120 mg total) into the skin every 28 days.     valsartan-hydrochlorothiazide 160-25 mg per tablet  Commonly known as:  DIOVAN-HCT  TK 1 T PO  D     VITAMIN B-12 1000 MCG tablet  Generic drug:  cyanocobalamin  Take 100 mcg by mouth once daily.     VOLTAREN 1 % Gel  Generic drug:  diclofenac sodium  Apply 2 g topically 4 (four) times daily.        STOP taking these medications    hydrocodone-homatropine 5-1.5 mg/5 ml 5-1.5 mg/5 mL Syrp  Commonly known as:  MAY Wilson MD  Obstetrics & Gynecology  [facility]

## 2019-02-06 NOTE — PLAN OF CARE
Problem: Adult Inpatient Plan of Care  Goal: Plan of Care Review  Outcome: Ongoing (interventions implemented as appropriate)  POC reviewed with patient and sister who both verbalized understanding. AAOx4. VSS on room air. Wore home CPAP overnight without any difficulty. Midline abdominal incision CDI with Telfa in place. Pain controlled with scheduled medications. Tolerating regular diet, denies any nausea. Up ad allen to bathroom, voiding without any difficulty. Passing flatus, no BM overnight. Refusing TEDS/SCDS. Safety precautions maintained, call light within reach.  Remains free from falls and injury. No acute events. No distress noted. Will continue to monitor. Sister at bedside.

## 2019-03-06 ENCOUNTER — TELEPHONE (OUTPATIENT)
Dept: GYNECOLOGIC ONCOLOGY | Facility: CLINIC | Age: 64
End: 2019-03-06

## 2019-03-08 ENCOUNTER — OFFICE VISIT (OUTPATIENT)
Dept: GYNECOLOGIC ONCOLOGY | Facility: CLINIC | Age: 64
End: 2019-03-08
Payer: COMMERCIAL

## 2019-03-08 VITALS
SYSTOLIC BLOOD PRESSURE: 168 MMHG | HEART RATE: 82 BPM | DIASTOLIC BLOOD PRESSURE: 76 MMHG | BODY MASS INDEX: 39.71 KG/M2 | WEIGHT: 246 LBS

## 2019-03-08 DIAGNOSIS — D3A.090 BRONCHIAL CARCINOID TUMORS: ICD-10-CM

## 2019-03-08 DIAGNOSIS — Z90.722 S/P BSO (BILATERAL SALPINGO-OOPHORECTOMY): Primary | ICD-10-CM

## 2019-03-08 PROCEDURE — 99024 PR POST-OP FOLLOW-UP VISIT: ICD-10-PCS | Mod: S$GLB,,, | Performed by: OBSTETRICS & GYNECOLOGY

## 2019-03-08 PROCEDURE — 99024 POSTOP FOLLOW-UP VISIT: CPT | Mod: S$GLB,,, | Performed by: OBSTETRICS & GYNECOLOGY

## 2019-03-08 PROCEDURE — 99999 PR PBB SHADOW E&M-EST. PATIENT-LVL III: ICD-10-PCS | Mod: PBBFAC,,, | Performed by: OBSTETRICS & GYNECOLOGY

## 2019-03-08 PROCEDURE — 99999 PR PBB SHADOW E&M-EST. PATIENT-LVL III: CPT | Mod: PBBFAC,,, | Performed by: OBSTETRICS & GYNECOLOGY

## 2019-03-08 NOTE — PROGRESS NOTES
Subjective:       Patient ID: Yvonne Holliday is a 63 y.o. female.    Chief Complaint: Post-op Evaluation (BSO)    HPI   Patient comes in today for post op visit after BSO with finding of a fibroma of the left ovary. Hysterectomy was not done due to morbid obesity and respiratory compromise.     Did well with surgery. No complaints.   Review of Systems   Constitutional: Negative for chills, fatigue and fever.   Gastrointestinal: Negative for abdominal pain.   Genitourinary: Negative for vaginal bleeding.   Neurological: Negative for weakness.       Objective:   BP (!) 168/76   Pulse 82   Wt 111.6 kg (246 lb)   BMI 39.71 kg/m²      Physical Exam   Constitutional: She is oriented to person, place, and time. She appears well-developed and well-nourished.   Abdominal:   Healing midline incision.    Neurological: She is alert and oriented to person, place, and time.       Assessment:       1. S/P BSO (bilateral salpingo-oophorectomy, 2/2 left-sided fibroma)    2. Bronchial carcinoid tumors        Plan:   S/P BSO (bilateral salpingo-oophorectomy, 2/2 left-sided fibroma)  Normal post op visit.   Discussed that uterus remains. Discussed need for evaluation if she has PMB.   RTC with her ob-gyn.   Bronchial carcinoid tumors

## 2019-03-16 DIAGNOSIS — D3A.090 BRONCHIAL CARCINOID TUMORS: ICD-10-CM

## 2019-03-16 RX ORDER — LANREOTIDE ACETATE 120 MG/.5ML
INJECTION SUBCUTANEOUS
Qty: 0.5 SYRINGE | Refills: 9 | Status: SHIPPED | OUTPATIENT
Start: 2019-03-16

## 2019-03-19 ENCOUNTER — TELEPHONE (OUTPATIENT)
Dept: NEUROLOGY | Facility: HOSPITAL | Age: 64
End: 2019-03-19

## 2019-03-19 ENCOUNTER — PATIENT MESSAGE (OUTPATIENT)
Dept: NEUROLOGY | Facility: HOSPITAL | Age: 64
End: 2019-03-19

## 2019-03-19 NOTE — TELEPHONE ENCOUNTER
----- Message from Mervat Rosenberg sent at 3/19/2019  1:56 PM CDT -----  Contact: Patient  RR:  Patient called, states that Cammy contacted her and stated that they had not been able to get ahold of this office and that her medication needs to be shipped tomorrow.  She can be reached at [phone].  Thank you  abc

## 2019-03-20 ENCOUNTER — TELEPHONE (OUTPATIENT)
Dept: HEMATOLOGY/ONCOLOGY | Facility: CLINIC | Age: 64
End: 2019-03-20

## 2019-04-02 ENCOUNTER — PATIENT MESSAGE (OUTPATIENT)
Dept: NEUROLOGY | Facility: HOSPITAL | Age: 64
End: 2019-04-02

## 2019-06-03 ENCOUNTER — TELEPHONE (OUTPATIENT)
Dept: NEUROLOGY | Facility: HOSPITAL | Age: 64
End: 2019-06-03

## 2019-06-03 NOTE — TELEPHONE ENCOUNTER
----- Message from Flor Live sent at 5/31/2019  1:06 PM CDT -----  Contact: Kathy hinton/ [place]- [phone]  RR: Rep called in to speak with Josee.    Please call.

## 2019-06-04 ENCOUNTER — PATIENT MESSAGE (OUTPATIENT)
Dept: NEUROLOGY | Facility: HOSPITAL | Age: 64
End: 2019-06-04

## 2019-06-19 ENCOUNTER — PATIENT MESSAGE (OUTPATIENT)
Dept: NEUROLOGY | Facility: HOSPITAL | Age: 64
End: 2019-06-19

## 2019-06-20 ENCOUNTER — PATIENT MESSAGE (OUTPATIENT)
Dept: NEUROLOGY | Facility: HOSPITAL | Age: 64
End: 2019-06-20

## 2019-08-02 ENCOUNTER — TELEPHONE (OUTPATIENT)
Dept: NEUROLOGY | Facility: HOSPITAL | Age: 64
End: 2019-08-02

## 2019-08-02 ENCOUNTER — PATIENT MESSAGE (OUTPATIENT)
Dept: NEUROLOGY | Facility: HOSPITAL | Age: 64
End: 2019-08-02

## 2019-08-02 DIAGNOSIS — C7A.8 NEUROENDOCRINE CANCER: Primary | ICD-10-CM

## 2019-08-10 ENCOUNTER — PATIENT MESSAGE (OUTPATIENT)
Dept: NEUROLOGY | Facility: HOSPITAL | Age: 64
End: 2019-08-10

## 2019-08-21 ENCOUNTER — TELEPHONE (OUTPATIENT)
Dept: NEUROLOGY | Facility: HOSPITAL | Age: 64
End: 2019-08-21

## 2019-08-21 ENCOUNTER — PATIENT MESSAGE (OUTPATIENT)
Dept: NEUROLOGY | Facility: HOSPITAL | Age: 64
End: 2019-08-21

## 2019-08-21 NOTE — TELEPHONE ENCOUNTER
Received a call from [facility], let her know that I have not spoken to patient, I have only communicated via the portal.  I will send a message to her asking her to contact [employer].

## 2019-08-27 ENCOUNTER — PATIENT MESSAGE (OUTPATIENT)
Dept: NEUROLOGY | Facility: HOSPITAL | Age: 64
End: 2019-08-27

## 2019-08-30 ENCOUNTER — HOSPITAL ENCOUNTER (OUTPATIENT)
Dept: RADIOLOGY | Facility: HOSPITAL | Age: 64
Discharge: HOME | End: 2019-08-30
Attending: INTERNAL MEDICINE
Payer: MEDICARE

## 2019-08-30 DIAGNOSIS — C7A.8 NEUROENDOCRINE CANCER: ICD-10-CM

## 2019-08-30 PROCEDURE — 78815 NM PET 68GA DOTATATE WHOLE BODY: ICD-10-PCS | Mod: 26,PI,, | Performed by: RADIOLOGY

## 2019-08-30 PROCEDURE — 78815 PET IMAGE W/CT SKULL-THIGH: CPT | Mod: 26,PI,, | Performed by: RADIOLOGY

## 2019-08-30 PROCEDURE — 78815 PET IMAGE W/CT SKULL-THIGH: CPT | Mod: TC,PI

## 2019-08-30 PROCEDURE — A9587 GALLIUM GA-68: HCPCS | Mod: TB

## 2019-09-17 ENCOUNTER — PATIENT MESSAGE (OUTPATIENT)
Dept: NEUROLOGY | Facility: HOSPITAL | Age: 64
End: 2019-09-17

## 2019-09-17 ENCOUNTER — OFFICE VISIT (OUTPATIENT)
Dept: NEUROLOGY | Facility: HOSPITAL | Age: 64
End: 2019-09-17
Attending: INTERNAL MEDICINE
Payer: MEDICARE

## 2019-09-17 VITALS
HEART RATE: 76 BPM | DIASTOLIC BLOOD PRESSURE: 77 MMHG | BODY MASS INDEX: 42.04 KG/M2 | TEMPERATURE: 98 F | SYSTOLIC BLOOD PRESSURE: 138 MMHG | WEIGHT: 261.56 LBS | HEIGHT: 66 IN

## 2019-09-17 DIAGNOSIS — M89.9 BONE LESION: ICD-10-CM

## 2019-09-17 DIAGNOSIS — D3A.090 BRONCHIAL CARCINOID TUMORS: Primary | ICD-10-CM

## 2019-09-17 PROCEDURE — 99215 OFFICE O/P EST HI 40 MIN: CPT | Performed by: INTERNAL MEDICINE

## 2019-09-17 PROCEDURE — 99214 PR OFFICE/OUTPT VISIT, EST, LEVL IV, 30-39 MIN: ICD-10-PCS | Mod: ,,, | Performed by: INTERNAL MEDICINE

## 2019-09-17 PROCEDURE — 99214 OFFICE O/P EST MOD 30 MIN: CPT | Mod: ,,, | Performed by: INTERNAL MEDICINE

## 2019-09-17 NOTE — PROGRESS NOTES
"NOLANETS:  [facility]  A collaboration between [facility] and [facility]    PATIENT: Yvonne Holliday  MRN: [id number]  DATE: 9/17/2019      Diagnosis:   1. Bronchial carcinoid tumors    2. Bone lesion        Chief Complaint: No chief complaint on file.      Oncologic History:      Oncologic History Bronchial carcinoid  diagnosed 10/2005     Oncologic Treatment Sandostatin  Lanreotide 7/2016     Pathology Well differentiated neuroendocrine tumor           Subjective:    Interval History: Ms. Holliday is a 64 y.o. female who returns for follow up for a bronchial carcinoid tumor.  She remains on lanreotide.  She states that she has generally been feeling well.  She underwent surgical resection of an ovarian tumor which was not consistent with malignancy and recovered well. She states that she gets weaker just prior to her Lanreotide injection but Perks up afterwards.  She has no other new complaints.    She was previously seen in this clinic by Salomón Jimenez and Atul but has not been seen since 4/2012.  Her history dates to 2005 when she was diagnosed with a bronchial carcinoid after having a chronic cough for a number of years.  She had had an abnormal x-ray which was monitored and had a biopsy in 9/2005 and was diagnosed with a well-differentiated neuroendocrine carcinoma.  Ki-67 demonstrated that the tumor had a low proliferative activity and was described as "a rare tumor cell is positive".  [facility]. Renan Vick who initially started her on short acting octreotide.  She was seen by Dr. Jimenez in July 2009 and converted to octreotide LAR.  She had a mild improvement in her cough but it also noted an improvement in symptoms of flushing.  She is followed in [facility] by Dr. Garibay.  In 7/2016 she was changed to Lanreotide.      Past Medical History:   Past Medical History: "   Diagnosis Date    Abnormal CT of liver 7/15/2016    Adnexal mass 7/15/2016    Asthma     Bone lesion 2019    Carcinoid syndrome 7/15/2016    Carcinoid tumor of lung 2005    Coughing     Drug therapy     sandostatin    HTN (hypertension)     Malignant carcinoid tumor of bronchus and lung 2016    Spastic paraplegia, hereditary     walks with cane    Thickened endometrium 2019    Wheezing        Past Surgical HIstory:   Past Surgical History:   Procedure Laterality Date    BREAST SURGERY      breast reduction     SECTION      IA REMOVAL OF OVARY/TUBE(S)      SALPINGO-OOPHORECTOMY Bilateral 2019    Performed by Regan Harrison MD at [facility] OR [address] [place] [address]       Family History:   Family History   Problem Relation Age of Onset    Cancer Mother         unknown primary    Cancer Maternal Aunt         gyn of some type    Cancer Paternal Grandfather         prostate    Cancer Maternal Aunt         bone cancer     Polycythemia Father     Heart disease Father 52    Aortic aneurysm Father     Breast cancer Neg Hx     Colon cancer Neg Hx        Social History:  reports that she has never smoked. She has never used smokeless tobacco. She reports that she does not drink alcohol or use drugs.    Allergies:  Review of patient's allergies indicates:   Allergen Reactions    Epinephrine      Neuroendocrine Tumor patient      Pcn [penicillins]        Medications:  Current Outpatient Medications   Medication Sig Dispense Refill    albuterol (PROAIR HFA) 90 mcg/actuation inhaler Inhale 2 puffs into the lungs every 6 (six) hours as needed for Wheezing.      AMINO ACIDS/MV,FE,MIN (OCUVITE EXTRA ORAL) Take by mouth.      baclofen (LIORESAL) 10 MG tablet Take 10 mg by mouth 2 (two) times daily.   2    budesonide-formoterol 80-4.5 mcg (SYMBICORT) 80-4.5 mcg/actuation HFAA Inhale 1 puff into the lungs 2 (two) times daily.       cetirizine (ZYRTEC) 10 MG tablet Take 10 mg by mouth  every evening.       clonazePAM (KLONOPIN) 0.5 MG tablet Take 0.5 mg by mouth 2 (two) times daily as needed for Anxiety.      cyanocobalamin (VITAMIN B-12) 1000 MCG tablet Take 100 mcg by mouth once daily.      dextromethorphan (DELSYM) 30 mg/5 mL liquid Take 60 mg by mouth nightly as needed for Cough.      diclofenac sodium (VOLTAREN) 1 % Gel Apply 2 g topically 4 (four) times daily.      docusate sodium (COLACE) 100 MG capsule Take 100 mg by mouth every evening.       gabapentin (NEURONTIN) 100 MG capsule Take 100 mg by mouth 2 (two) times daily.      hydrochlorothiazide (HYDRODIURIL) 25 MG tablet as needed.   1    ibuprofen (ADVIL,MOTRIN) 800 MG tablet Take 1 tablet (800 mg total) by mouth every 8 (eight) hours. 60 tablet 0    latanoprost 0.005 % ophthalmic solution Place 1 drop into both eyes every evening.      loratadine (CLARITIN) 10 mg tablet Take 10 mg by mouth every morning.       methocarbamol (ROBAXIN) 500 MG Tab Take 500 mg by mouth 2 (two) times daily as needed.      multivitamin with minerals tablet Take 1 tablet by mouth once daily.      POTASSIUM CITRATE ORAL Take 99 mg by mouth every morning.      ranitidine (ZANTAC) 75 MG tablet Take 150 mg by mouth nightly.       SOMATULINE DEPOT 120 mg/0.5 mL Syrg INJECT 120 MG SUBCUTANEOUSLY EVERY 28 DAYS (GIVEN AT PRESCRIBERS OFFICE) 0.5 Syringe 9    valsartan-hydrochlorothiazide (DIOVAN-HCT) 160-25 mg per tablet TK 1 T PO  D  3     No current facility-administered medications for this visit.        Review of Systems   Constitutional: Positive for fatigue. Negative for chills, fever and unexpected weight change.   HENT: Negative for congestion, hearing loss and nosebleeds.    Eyes: Negative for visual disturbance.   Respiratory: Positive for cough and shortness of breath.    Cardiovascular: Negative for chest pain, palpitations and leg swelling.   Gastrointestinal: Positive for diarrhea. Negative for abdominal pain, blood in stool,  "constipation, nausea and vomiting.   Genitourinary: Negative for dysuria.   Musculoskeletal: Positive for arthralgias and myalgias. Negative for back pain and gait problem.   Skin: Negative for color change and rash.        Flushing   Neurological: Negative for dizziness, weakness and headaches.   Hematological: Negative for adenopathy. Does not bruise/bleed easily.   Psychiatric/Behavioral: Negative for confusion.       ECOG Performance Status: 1   Objective:      Vitals:   Vitals:    09/17/19 1134   BP: 138/77   BP Location: Left arm   Patient Position: Sitting   BP Method: Large (Automatic)   Pulse: 76   Temp: 97.8 °F (36.6 °C)   TempSrc: Oral   Weight: 118.6 kg (261 lb 9.2 oz)   Height: 5' 6" (1.676 m)     BMI: Body mass index is 42.22 kg/m².    Physical Exam   Constitutional: She is oriented to person, place, and time. She appears well-developed and well-nourished. No distress.   HENT:   Head: Normocephalic.   Mouth/Throat: No oropharyngeal exudate.   Eyes: EOM are normal. No scleral icterus.   Neck: Neck supple. No tracheal deviation present. No thyromegaly present.   Cardiovascular: Normal rate and regular rhythm.   Pulmonary/Chest: Effort normal and breath sounds normal. No respiratory distress. She has no wheezes. She has no rales.   Abdominal: Soft. She exhibits no distension and no mass. There is no tenderness. There is no rebound and no guarding.   Musculoskeletal: Normal range of motion. She exhibits edema.   Lymphadenopathy:     She has no cervical adenopathy.   Neurological: She is alert and oriented to person, place, and time. No cranial nerve deficit.   Skin: Skin is warm and dry.   Psychiatric: She has a normal mood and affect.       Laboratory Data:  No visits with results within 1 Month(s) from this visit.   Latest known visit with results is:   Admission on 02/04/2019, Discharged on 02/06/2019   Component Date Value Ref Range Status    WBC 02/05/2019 12.39  3.90 - 12.70 K/uL Final    RBC " 02/05/2019 3.87* 4.00 - 5.40 M/uL Final    Hemoglobin 02/05/2019 11.9* 12.0 - 16.0 g/dL Final    Hematocrit 02/05/2019 37.8  37.0 - 48.5 % Final    Mean Corpuscular Volume 02/05/2019 98  82 - 98 fL Final    Mean Corpuscular Hemoglobin 02/05/2019 30.7  27.0 - 31.0 pg Final    Mean Corpuscular Hemoglobin Conc 02/05/2019 31.5* 32.0 - 36.0 g/dL Final    RDW 02/05/2019 12.8  11.5 - 14.5 % Final    Platelets 02/05/2019 227  150 - 350 K/uL Final    MPV 02/05/2019 10.5  9.2 - 12.9 fL Final    Immature Granulocytes 02/05/2019 0.4  0.0 - 0.5 % Final    Gran # (ANC) 02/05/2019 9.6* 1.8 - 7.7 K/uL Final    Immature Grans (Abs) 02/05/2019 0.05* 0.00 - 0.04 K/uL Final    Comment: Mild elevation in immature granulocytes is non specific and   can be seen in a variety of conditions including stress response,   acute inflammation, trauma and pregnancy. Correlation with other   laboratory and clinical findings is essential.      Lymph # 02/05/2019 1.7  1.0 - 4.8 K/uL Final    Mono # 02/05/2019 0.8  0.3 - 1.0 K/uL Final    Eos # 02/05/2019 0.1  0.0 - 0.5 K/uL Final    Baso # 02/05/2019 0.03  0.00 - 0.20 K/uL Final    nRBC 02/05/2019 0  0 /100 WBC Final    Gran% 02/05/2019 77.8* 38.0 - 73.0 % Final    Lymph% 02/05/2019 13.9* 18.0 - 48.0 % Final    Mono% 02/05/2019 6.6  4.0 - 15.0 % Final    Eosinophil% 02/05/2019 1.1  0.0 - 8.0 % Final    Basophil% 02/05/2019 0.2  0.0 - 1.9 % Final    Differential Method 02/05/2019 Automated   Final       Imaging:   Gallium 68 PET-CT 08/30/2019    COMPARISON:  CT abdomen pelvis 12/15/2018    FINDINGS:  Quality of the study: Adequate.    Abnormal radiotracer uptake in the right C4 pedicle vs. facet which demonstrates an SUV max of 3.2 with associated ill-defined sclerosis on corresponding CT.  Additionally, there is a 2.4 x 2.8 cm solid pulmonary nodule within the right lower lobe of the lung which demonstrates abnormal radiotracer uptake within SUV max of 15.3.    Nonspecific mild  elevation of radiotracer uptake within the thyroid gland.    Physiologic distribution of the tracer is seen within the pituitary, salivary, and thyroid glands, stomach, liver, pancreas uncinate process, spleen, adrenal glands, kidneys and urinary bladder, and bowel.    Incidental CT findings: Multinodular thyroid gland.  Multiple solid nodules of varying sizes scattered throughout the lungs bilaterally, similar to prior examinations.  Hepatomegaly.  Hyperdense material within the gallbladder likely representing cholelithiasis.  Redemonstration of multiple remote posterolateral right rib fractures.      Impression       In this patient with history of malignant neuroendocrine tumor, there is abnormal radiotracer uptake in the right C4 pedicle vs. facet and within a solid pulmonary nodule located in the right lower lobe of the lung.  Findings concerning for metastatic disease.    Nonspecific mild elevation of radiotracer uptake within the multinodular thyroid gland.  Recommend dedicated ultrasound evaluation for further characterization.    Multiple solid pulmonary nodules of varying sizes scattered throughout the lungs bilaterally, similar to prior examinations.  Many of these are too small to characterize by molecular imaging.    Probable cholelithiasis.    Additional findings as above.    This report was flagged in Epic as abnormal.                  Assessment:       1. Bronchial carcinoid tumors    2. Bone lesion           Plan:     Mrs. Holliday is doing well clinically and tolerating treatment with Lanreotide.  Her gallium 68 PET-CT shows overall stability of disease within her lung.  It did show evidence of a possible cervical spine metastasis.  I would like to confirm this by getting an MRI, however, she is unable to tolerate an MRI.  I will discuss her case in tumor Board and if this is truly felt to be metastatic then I would favor treatment with Zometa every 3 months.  I have discussed the rationale,  alternatives and potential side effects of this treatment with her.  I have given her written information on this.  Will notify her next week after tumor Board with further recommendations.  All questions were answered and she is agreeable with this plan.    Lobo Smith DO, [facility]  Hematology & Oncology, [facility]  [address], [address]  [place]  [address]  ph. [phone]; [phone]  fax. [phone]    25 minutes were spent in coordination of patient's care, record review and counseling.  More than 50% of the time was face-to-face.

## 2019-09-17 NOTE — PATIENT INSTRUCTIONS
We will discuss you in tumor board and call you with recommendations        Zoledronic Acid injection (Hypercalcemia, Oncology)  What is this medicine?  ZOLEDRONIC ACID (NOLAN arnulfo heller ik AS id) lowers the amount of calcium loss from bone. It is used to treat too much calcium in your blood from cancer. It is also used to prevent complications of cancer that has spread to the bone.  How should I use this medicine?  This medicine is for infusion into a vein. It is given by a health care professional in a hospital or clinic setting.  Talk to your pediatrician regarding the use of this medicine in children. Special care may be needed.  What side effects may I notice from receiving this medicine?  Side effects that you should report to your doctor or health care professional as soon as possible:  · allergic reactions like skin rash, itching or hives, swelling of the face, lips, or tongue  · anxiety, confusion, or depression  · breathing problems  · changes in vision  · eye pain  · feeling faint or lightheaded, falls  · jaw pain, especially after dental work  · mouth sores  · muscle cramps, stiffness, or weakness  · redness, blistering, peeling or loosening of the skin, including inside the mouth  · trouble passing urine or change in the amount of urine  Side effects that usually do not require medical attention (report to your doctor or health care professional if they continue or are bothersome):  · bone, joint, or muscle pain  · constipation  · diarrhea  · fever  · hair loss  · irritation at site where injected  · loss of appetite  · nausea, vomiting  · stomach upset  · trouble sleeping  · trouble swallowing  · weak or tired  What may interact with this medicine?  · certain antibiotics given by injection  · NSAIDs, medicines for pain and inflammation, like ibuprofen or naproxen  · some diuretics like bumetanide, furosemide  · teriparatide  · thalidomide  What if I miss a dose?  It is important not to miss your dose. Call  your doctor or health care professional if you are unable to keep an appointment.  Where should I keep my medicine?  This drug is given in a hospital or clinic and will not be stored at home.  What should I tell my health care provider before I take this medicine?  They need to know if you have any of these conditions:  · aspirin-sensitive asthma  · cancer, especially if you are receiving medicines used to treat cancer  · dental disease or wear dentures  · infection  · kidney disease  · receiving corticosteroids like dexamethasone or prednisone  · an unusual or allergic reaction to zoledronic acid, other medicines, foods, dyes, or preservatives  · pregnant or trying to get pregnant  · breast-feeding  What should I watch for while using this medicine?  Visit your doctor or health care professional for regular checkups. It may be some time before you see the benefit from this medicine. Do not stop taking your medicine unless your doctor tells you to. Your doctor may order blood tests or other tests to see how you are doing.  Women should inform their doctor if they wish to become pregnant or think they might be pregnant. There is a potential for serious side effects to an unborn child. Talk to your health care professional or pharmacist for more information.  You should make sure that you get enough calcium and vitamin D while you are taking this medicine. Discuss the foods you eat and the vitamins you take with your health care professional.  Some people who take this medicine have severe bone, joint, and/or muscle pain. This medicine may also increase your risk for jaw problems or a broken thigh bone. Tell your doctor right away if you have severe pain in your jaw, bones, joints, or muscles. Tell your doctor if you have any pain that does not go away or that gets worse.  Tell your dentist and dental surgeon that you are taking this medicine. You should not have major dental surgery while on this medicine. See your  dentist to have a dental exam and fix any dental problems before starting this medicine. Take good care of your teeth while on this medicine. Make sure you see your dentist for regular follow-up appointments.  NOTE:This sheet is a summary. It may not cover all possible information. If you have questions about this medicine, talk to your doctor, pharmacist, or health care provider. Copyright© 2017 [employer]

## 2019-09-17 NOTE — LETTER
September 17, 2019        Clovis Ratliff Jr., MD  [address] [facility]  Department Of Hematology/Medical Oncology  [facility]  [place] [address]             [facility] [address] [place] [address]  Phone: [phone]  Fax: [phone]   Patient: Yvonne Holliday   MR Number: [id number]   YOB: 1955   Date of Visit: 9/17/2019       Dear Dr. Ratliff:    Thank you for referring Yvonne Holliday to me for evaluation. Attached you will find relevant portions of my assessment and plan of care.    If you have questions, please do not hesitate to call me. I look forward to following Yvonne Holliday along with you.    Sincerely,      Lobo Smith DO, FACP            CC  No Recipients    Enclosure

## 2021-01-18 ENCOUNTER — PATIENT MESSAGE (OUTPATIENT)
Dept: NEUROLOGY | Facility: HOSPITAL | Age: 66
End: 2021-01-18

## 2021-01-26 LAB
EXT 24 HR UR METANEPHRINE: NORMAL
EXT 24 HR UR NORMETANEPHRINE: NORMAL
EXT 24 HR UR NORMETANEPHRINE: NORMAL
EXT 25 HYDROXY VIT D2: NORMAL
EXT 25 HYDROXY VIT D3: NORMAL
EXT 5 HIAA 24 HR URINE: NORMAL
EXT 5 HIAA BLOOD: NORMAL
EXT ACTH: NORMAL
EXT AFP: NORMAL
EXT ALBUMIN: 3.6 GM/DL
EXT ALKALINE PHOSPHATASE: 155 UNITS/L
EXT ALT: 38 UNITS/L
EXT AMYLASE: NORMAL
EXT ANTI ISLET CELL AB: NORMAL
EXT ANTI PARIETAL CELL AB: NORMAL
EXT ANTI THYROID AB: NORMAL
EXT AST: 23 UNITS/L
EXT BILIRUBIN DIRECT: NORMAL
EXT BILIRUBIN TOTAL: 0.5 MG/DL
EXT BK VIRUS DNA QN PCR: NORMAL
EXT BUN: 15 MG/DL
EXT C PEPTIDE: NORMAL
EXT CA 125: NORMAL
EXT CA 19-9: NORMAL
EXT CA 27-29: NORMAL
EXT CALCITONIN: NORMAL
EXT CALCIUM: 9.2 MG/DL
EXT CEA: NORMAL
EXT CHLORIDE: 102 MMOL/L
EXT CHOLESTEROL: NORMAL
EXT CHROMOGRANIN A: NORMAL
EXT CO2: 33 MMOL/L
EXT CREATININE UA: NORMAL
EXT CREATININE: 0.8 MG/DL
EXT CYCLOSPORONE LEVEL: NORMAL
EXT DOPAMINE: NORMAL
EXT EBV DNA BY PCR: NORMAL
EXT EPINEPHRINE: NORMAL
EXT FOLATE: NORMAL
EXT FREE T3: NORMAL
EXT FREE T4: NORMAL
EXT FSH: NORMAL
EXT GASTRIN RELEASING PEPTIDE: NORMAL
EXT GASTRIN RELEASING PEPTIDE: NORMAL
EXT GASTRIN: NORMAL
EXT GGT: NORMAL
EXT GHRELIN: NORMAL
EXT GLUCAGON: NORMAL
EXT GLUCOSE: 98 MG/DL
EXT GROWTH HORMONE: NORMAL
EXT HCV RNA QUANT PCR: NORMAL
EXT HDL: NORMAL
EXT HEMATOCRIT: 38.9 %
EXT HEMOGLOBIN A1C: NORMAL
EXT HEMOGLOBIN: 13.1 GM/DL
EXT HISTAMINE 24 HR URINE: NORMAL
EXT HISTAMINE: NORMAL
EXT IGF-1: NORMAL
EXT IMMUNKNOW (NON-STIMULATED): NORMAL
EXT IMMUNKNOW (STIMULATED): NORMAL
EXT INR: NORMAL
EXT INSULIN: NORMAL
EXT LANREOTIDE LEVEL: NORMAL
EXT LDH, TOTAL: NORMAL
EXT LDL CHOLESTEROL: NORMAL
EXT LIPASE: NORMAL
EXT MAGNESIUM: NORMAL
EXT METANEPHRINE FREE PLASMA: NORMAL
EXT MOTILIN: NORMAL
EXT NEUROKININ A CAMB: NORMAL
EXT NEUROKININ A ISI: NORMAL
EXT NEUROTENSIN: NORMAL
EXT NOREPINEPHRINE: NORMAL
EXT NORMETANEPHRINE: NORMAL
EXT NSE: NORMAL
EXT OCTREOTIDE LEVEL: NORMAL
EXT PANCREASTATIN CAMB: NORMAL
EXT PANCREASTATIN ISI: NORMAL
EXT PANCREATIC POLYPEPTIDE: NORMAL
EXT PHOSPHORUS: NORMAL
EXT PLATELETS: 310 K/CUMM
EXT POTASSIUM: 3.6 MMOL/L
EXT PROGRAF LEVEL: NORMAL
EXT PROLACTIN: NORMAL
EXT PROTEIN TOTAL: 7.8 GM/DL
EXT PROTEIN UA: NORMAL
EXT PT: NORMAL
EXT PTH, INTACT: NORMAL
EXT PTT: NORMAL
EXT RAPAMUNE LEVEL: NORMAL
EXT SEROTONIN: NORMAL
EXT SODIUM: 139 MMOL/L
EXT SOMATOSTATIN: NORMAL
EXT SUBSTANCE P: NORMAL
EXT TRIGLYCERIDES: NORMAL
EXT TRYPTASE: NORMAL
EXT TSH: NORMAL
EXT URIC ACID: NORMAL
EXT URINE AMYLASE U/HR: NORMAL
EXT URINE AMYLASE U/L: NORMAL
EXT VASOACTIVE INTESTINAL POLYPEPTIDE: NORMAL
EXT VITAMIN B12: NORMAL
EXT VMA 24 HR URINE: NORMAL
EXT WBC: 8.9 K/CUMM
GLOBULIN: 4.2 GM/DL
NEURON SPECIFIC ENOLASE: NORMAL

## 2021-03-23 ENCOUNTER — TELEPHONE (OUTPATIENT)
Dept: NEUROLOGY | Facility: HOSPITAL | Age: 66
End: 2021-03-23

## 2021-03-23 LAB
EXT 24 HR UR METANEPHRINE: NORMAL
EXT 24 HR UR NORMETANEPHRINE: NORMAL
EXT 24 HR UR NORMETANEPHRINE: NORMAL
EXT 25 HYDROXY VIT D2: NORMAL
EXT 25 HYDROXY VIT D3: NORMAL
EXT 5 HIAA 24 HR URINE: NORMAL
EXT 5 HIAA BLOOD: NORMAL
EXT ACTH: NORMAL
EXT AFP: NORMAL
EXT ALBUMIN: 3.4 GM/DL
EXT ALKALINE PHOSPHATASE: 142 UNITS/L
EXT ALT: 31 UNITS/L
EXT AMYLASE: NORMAL
EXT ANTI ISLET CELL AB: NORMAL
EXT ANTI PARIETAL CELL AB: NORMAL
EXT ANTI THYROID AB: NORMAL
EXT AST: 24 UNITS/L
EXT BILIRUBIN DIRECT: NORMAL
EXT BILIRUBIN TOTAL: 0.4 MG/DL
EXT BK VIRUS DNA QN PCR: NORMAL
EXT BUN: 14 MG/DL
EXT C PEPTIDE: NORMAL
EXT CA 125: NORMAL
EXT CA 19-9: NORMAL
EXT CA 27-29: NORMAL
EXT CALCITONIN: NORMAL
EXT CALCIUM: 9.1 MG/DL
EXT CEA: NORMAL
EXT CHLORIDE: 104 MMOL/L
EXT CHOLESTEROL: NORMAL
EXT CHROMOGRANIN A: NORMAL
EXT CO2: 30 MMOL/L
EXT CREATININE UA: NORMAL
EXT CREATININE: 0.8 MG/DL
EXT CYCLOSPORONE LEVEL: NORMAL
EXT DOPAMINE: NORMAL
EXT EBV DNA BY PCR: NORMAL
EXT EPINEPHRINE: NORMAL
EXT FOLATE: NORMAL
EXT FREE T3: NORMAL
EXT FREE T4: NORMAL
EXT FSH: NORMAL
EXT GASTRIN RELEASING PEPTIDE: NORMAL
EXT GASTRIN RELEASING PEPTIDE: NORMAL
EXT GASTRIN: NORMAL
EXT GGT: NORMAL
EXT GHRELIN: NORMAL
EXT GLUCAGON: NORMAL
EXT GLUCOSE: 205 MG/DL
EXT GROWTH HORMONE: NORMAL
EXT HCV RNA QUANT PCR: NORMAL
EXT HDL: NORMAL
EXT HEMATOCRIT: 37.1 %
EXT HEMOGLOBIN A1C: NORMAL
EXT HEMOGLOBIN: 12.4 GM/DL
EXT HISTAMINE 24 HR URINE: NORMAL
EXT HISTAMINE: NORMAL
EXT IGF-1: NORMAL
EXT IMMUNKNOW (NON-STIMULATED): NORMAL
EXT IMMUNKNOW (STIMULATED): NORMAL
EXT INR: NORMAL
EXT INSULIN: NORMAL
EXT LANREOTIDE LEVEL: NORMAL
EXT LDH, TOTAL: NORMAL
EXT LDL CHOLESTEROL: NORMAL
EXT LIPASE: NORMAL
EXT MAGNESIUM: NORMAL
EXT METANEPHRINE FREE PLASMA: NORMAL
EXT MOTILIN: NORMAL
EXT NEUROKININ A CAMB: NORMAL
EXT NEUROKININ A ISI: NORMAL
EXT NEUROTENSIN: NORMAL
EXT NOREPINEPHRINE: NORMAL
EXT NORMETANEPHRINE: NORMAL
EXT NSE: NORMAL
EXT OCTREOTIDE LEVEL: NORMAL
EXT PANCREASTATIN CAMB: NORMAL
EXT PANCREASTATIN ISI: NORMAL
EXT PANCREATIC POLYPEPTIDE: NORMAL
EXT PHOSPHORUS: NORMAL
EXT PLATELETS: 251 K/CUMM
EXT POTASSIUM: 3.6 MMOL/L
EXT PROGRAF LEVEL: NORMAL
EXT PROLACTIN: NORMAL
EXT PROTEIN TOTAL: 7.8 MG/DL
EXT PROTEIN UA: NORMAL
EXT PT: NORMAL
EXT PTH, INTACT: NORMAL
EXT PTT: NORMAL
EXT RAPAMUNE LEVEL: NORMAL
EXT SEROTONIN: NORMAL
EXT SODIUM: 140 MMOL/L
EXT SOMATOSTATIN: NORMAL
EXT SUBSTANCE P: NORMAL
EXT TRIGLYCERIDES: NORMAL
EXT TRYPTASE: NORMAL
EXT TSH: NORMAL
EXT URIC ACID: NORMAL
EXT URINE AMYLASE U/HR: NORMAL
EXT URINE AMYLASE U/L: NORMAL
EXT VASOACTIVE INTESTINAL POLYPEPTIDE: NORMAL
EXT VITAMIN B12: NORMAL
EXT VMA 24 HR URINE: NORMAL
EXT WBC: 8.7 K/CUMM
GLOBULIN: 4.4 GM/DL
NEURON SPECIFIC ENOLASE: NORMAL

## 2021-03-24 ENCOUNTER — TELEPHONE (OUTPATIENT)
Dept: NEUROLOGY | Facility: HOSPITAL | Age: 66
End: 2021-03-24

## 2021-03-24 DIAGNOSIS — M89.9 BONE LESION: ICD-10-CM

## 2021-03-24 DIAGNOSIS — C7A.090 MALIGNANT CARCINOID TUMOR OF BRONCHUS AND LUNG: Primary | ICD-10-CM

## 2021-03-24 DIAGNOSIS — E34.0 CARCINOID SYNDROME: ICD-10-CM

## 2021-04-20 ENCOUNTER — HOSPITAL ENCOUNTER (OUTPATIENT)
Dept: RADIOLOGY | Facility: HOSPITAL | Age: 66
Discharge: HOME | End: 2021-04-20
Attending: SURGERY
Payer: MEDICARE

## 2021-04-20 DIAGNOSIS — E34.0 CARCINOID SYNDROME: ICD-10-CM

## 2021-04-20 DIAGNOSIS — M89.9 BONE LESION: ICD-10-CM

## 2021-04-20 DIAGNOSIS — C7A.090 MALIGNANT CARCINOID TUMOR OF BRONCHUS AND LUNG: ICD-10-CM

## 2021-04-20 PROCEDURE — 78815 PET IMAGE W/CT SKULL-THIGH: CPT | Mod: TC,PS

## 2021-04-20 PROCEDURE — 78815 PET IMAGE W/CT SKULL-THIGH: CPT | Mod: 26,PS,, | Performed by: RADIOLOGY

## 2021-04-20 PROCEDURE — 78815 NM PET CU64, SKULL TO MID THIGH: ICD-10-PCS | Mod: 26,PS,, | Performed by: RADIOLOGY

## 2021-04-26 ENCOUNTER — OFFICE VISIT (OUTPATIENT)
Dept: NEUROLOGY | Facility: HOSPITAL | Age: 66
End: 2021-04-26
Attending: SURGERY
Payer: MEDICARE

## 2021-04-26 DIAGNOSIS — M89.9 BONE LESION: ICD-10-CM

## 2021-04-26 DIAGNOSIS — C7A.090 MALIGNANT CARCINOID TUMOR OF BRONCHUS AND LUNG: ICD-10-CM

## 2021-04-26 DIAGNOSIS — E34.0 CARCINOID SYNDROME: Primary | ICD-10-CM

## 2021-05-05 ENCOUNTER — TELEPHONE (OUTPATIENT)
Dept: NEUROLOGY | Facility: HOSPITAL | Age: 66
End: 2021-05-05

## 2021-05-05 ENCOUNTER — DOCUMENTATION ONLY (OUTPATIENT)
Dept: CARDIOTHORACIC SURGERY | Facility: CLINIC | Age: 66
End: 2021-05-05

## 2021-10-06 ENCOUNTER — PATIENT MESSAGE (OUTPATIENT)
Dept: NEUROLOGY | Facility: HOSPITAL | Age: 66
End: 2021-10-06

## 2022-03-28 ENCOUNTER — TELEPHONE (OUTPATIENT)
Dept: NEUROLOGY | Facility: HOSPITAL | Age: 67
End: 2022-03-28
Payer: COMMERCIAL

## 2022-03-28 NOTE — TELEPHONE ENCOUNTER
----- Message from Madisyn Bobbi sent at 3/28/2022 11:08 AM CDT -----  Regarding: Orders  Contact: Taylor Rubio, [facility], [phone]  Called in requesting order for CT chest without faxed to [phone]. Please advise.

## 2022-03-28 NOTE — TELEPHONE ENCOUNTER
Taylor with [facility] requesting chest ct scan to be faxed to [phone].  Orders faxed as requested.

## 2022-04-04 ENCOUNTER — PATIENT MESSAGE (OUTPATIENT)
Dept: NEUROLOGY | Facility: HOSPITAL | Age: 67
End: 2022-04-04
Payer: COMMERCIAL

## 2022-05-17 ENCOUNTER — PATIENT MESSAGE (OUTPATIENT)
Dept: NEUROLOGY | Facility: HOSPITAL | Age: 67
End: 2022-05-17
Payer: COMMERCIAL

## 2024-09-05 ENCOUNTER — PATIENT MESSAGE (OUTPATIENT)
Dept: NEUROLOGY | Facility: CLINIC | Age: 69
End: 2024-09-05
Payer: COMMERCIAL

## (undated) DEVICE — SYR 30CC LUER LOCK

## (undated) DEVICE — SUT MCRYL PLUS 4-0 PS2 27IN

## (undated) DEVICE — SUT CTD VICRYL 0 VIL BR/CT

## (undated) DEVICE — ELECTRODE REM PLYHSV RETURN 9

## (undated) DEVICE — NDL 20GX1-1/2IN IB

## (undated) DEVICE — SYR 10CC LUER LOCK

## (undated) DEVICE — DRAPE STERI INSTRUMENT 1018

## (undated) DEVICE — SEE MEDLINE ITEM 156902

## (undated) DEVICE — SUT 0 54IN COATED VICRYL U

## (undated) DEVICE — CLIPPER BLADE MOD 4406 (CAREF)

## (undated) DEVICE — SEE MEDLINE ITEM 154981

## (undated) DEVICE — LOOP VESSEL YELLOW MAXI

## (undated) DEVICE — SET DECANTER MEDICHOICE

## (undated) DEVICE — TRAY FOLEY 16FR INFECTION CONT

## (undated) DEVICE — SEE MEDLINE ITEM 157181

## (undated) DEVICE — LUBRICANT SURGILUBE 2 OZ

## (undated) DEVICE — SEE MEDLINE ITEM 157117

## (undated) DEVICE — SEE MEDLINE ITEM 146417

## (undated) DEVICE — SUT 2/0 54IN COATED VICRYL

## (undated) DEVICE — DRESSING MEPORE ADH 3.5X12

## (undated) DEVICE — APPLICATOR CHLORAPREP ORN 26ML

## (undated) DEVICE — SEE MEDLINE ITEM 152622

## (undated) DEVICE — SUT CTD VICRYL VIL BR CR/SH

## (undated) DEVICE — LEGGINGS 48X31 INCH

## (undated) DEVICE — DRESSING ABSRBNT ISLAND 3.6X8

## (undated) DEVICE — SUT CTD VICRYL VIL BR SH 27

## (undated) DEVICE — SPONGE LAP 18X18 PREWASHED

## (undated) DEVICE — SEE MEDLINE ITEM 157148

## (undated) DEVICE — SUT 1 48IN PDS II VIO MONO